# Patient Record
Sex: MALE | Race: WHITE | NOT HISPANIC OR LATINO | Employment: OTHER | URBAN - METROPOLITAN AREA
[De-identification: names, ages, dates, MRNs, and addresses within clinical notes are randomized per-mention and may not be internally consistent; named-entity substitution may affect disease eponyms.]

---

## 2018-02-24 ENCOUNTER — APPOINTMENT (OUTPATIENT)
Dept: GENERAL RADIOLOGY | Facility: HOSPITAL | Age: 63
End: 2018-02-24

## 2018-02-24 ENCOUNTER — HOSPITAL ENCOUNTER (INPATIENT)
Facility: HOSPITAL | Age: 63
LOS: 9 days | Discharge: HOME | End: 2018-03-05
Attending: EMERGENCY MEDICINE | Admitting: INTERNAL MEDICINE

## 2018-02-24 DIAGNOSIS — E87.20 METABOLIC ACIDOSIS: ICD-10-CM

## 2018-02-24 DIAGNOSIS — E13.10 DIABETIC KETOACIDOSIS WITHOUT COMA ASSOCIATED WITH OTHER SPECIFIED DIABETES MELLITUS (HCC): ICD-10-CM

## 2018-02-24 DIAGNOSIS — N17.9 ACUTE RENAL FAILURE, UNSPECIFIED ACUTE RENAL FAILURE TYPE (HCC): ICD-10-CM

## 2018-02-24 DIAGNOSIS — K81.9 CHOLECYSTITIS: ICD-10-CM

## 2018-02-24 DIAGNOSIS — Z74.09 IMPAIRED FUNCTIONAL MOBILITY, BALANCE, GAIT, AND ENDURANCE: ICD-10-CM

## 2018-02-24 DIAGNOSIS — J10.1 INFLUENZA B: Primary | ICD-10-CM

## 2018-02-24 DIAGNOSIS — I48.3 TYPICAL ATRIAL FLUTTER (HCC): ICD-10-CM

## 2018-02-24 PROBLEM — R11.2 NAUSEA & VOMITING: Status: ACTIVE | Noted: 2018-02-24

## 2018-02-24 PROBLEM — E11.9 DIABETES MELLITUS (HCC): Status: ACTIVE | Noted: 2018-02-24

## 2018-02-24 PROBLEM — R19.7 DIARRHEA: Status: ACTIVE | Noted: 2018-02-24

## 2018-02-24 PROBLEM — E11.10 DIABETIC KETOACIDOSIS WITHOUT COMA (HCC): Status: ACTIVE | Noted: 2018-02-24

## 2018-02-24 LAB
ALBUMIN SERPL-MCNC: 4.7 G/DL (ref 3.2–4.8)
ALBUMIN/GLOB SERPL: 1.5 G/DL (ref 1.5–2.5)
ALP SERPL-CCNC: 113 U/L (ref 25–100)
ALT SERPL W P-5'-P-CCNC: 43 U/L (ref 7–40)
ANION GAP SERPL CALCULATED.3IONS-SCNC: ABNORMAL MMOL/L (ref 3–11)
ARTERIAL PATENCY WRIST A: ABNORMAL
ARTERIAL PATENCY WRIST A: ABNORMAL
AST SERPL-CCNC: 37 U/L (ref 0–33)
ATMOSPHERIC PRESS: ABNORMAL MMHG
ATMOSPHERIC PRESS: ABNORMAL MMHG
BACTERIA UR QL AUTO: ABNORMAL /HPF
BASE EXCESS BLDA CALC-SCNC: -19 MMOL/L (ref 0–2)
BASE EXCESS BLDA CALC-SCNC: <-20 MMOL/L (ref 0–2)
BASOPHILS # BLD MANUAL: 0 10*3/MM3 (ref 0–0.2)
BASOPHILS NFR BLD AUTO: 0 % (ref 0–1)
BDY SITE: ABNORMAL
BDY SITE: ABNORMAL
BILIRUB SERPL-MCNC: 0.4 MG/DL (ref 0.3–1.2)
BILIRUB UR QL STRIP: NEGATIVE
BNP SERPL-MCNC: 103 PG/ML (ref 0–100)
BUN BLD-MCNC: 26 MG/DL (ref 9–23)
BUN BLD-MCNC: 32 MG/DL (ref 9–23)
BUN BLD-MCNC: 32 MG/DL (ref 9–23)
BUN/CREAT SERPL: 18.8 (ref 7–25)
BUN/CREAT SERPL: 20 (ref 7–25)
BUN/CREAT SERPL: 21.3 (ref 7–25)
CA-I SERPL ISE-MCNC: 1.3 MMOL/L (ref 1.12–1.32)
CA-I SERPL ISE-MCNC: 1.39 MMOL/L (ref 1.12–1.32)
CALCIUM SPEC-SCNC: 8.4 MG/DL (ref 8.7–10.4)
CALCIUM SPEC-SCNC: 8.7 MG/DL (ref 8.7–10.4)
CALCIUM SPEC-SCNC: 9.7 MG/DL (ref 8.7–10.4)
CHLORIDE SERPL-SCNC: 106 MMOL/L (ref 99–109)
CHLORIDE SERPL-SCNC: 112 MMOL/L (ref 99–109)
CHLORIDE SERPL-SCNC: 94 MMOL/L (ref 99–109)
CLARITY UR: CLEAR
CO2 BLDA-SCNC: 2.7 MMOL/L (ref 22–33)
CO2 BLDA-SCNC: 7.2 MMOL/L (ref 22–33)
CO2 SERPL-SCNC: <10 MMOL/L (ref 20–31)
COARSE GRAN CASTS URNS QL MICRO: ABNORMAL /LPF
COHGB MFR BLD: 0.5 % (ref 0–2)
COHGB MFR BLD: 1 % (ref 0–2)
COLOR UR: YELLOW
CREAT BLD-MCNC: 1.3 MG/DL (ref 0.6–1.3)
CREAT BLD-MCNC: 1.5 MG/DL (ref 0.6–1.3)
CREAT BLD-MCNC: 1.7 MG/DL (ref 0.6–1.3)
D-LACTATE SERPL-SCNC: 1.3 MMOL/L (ref 0.5–2)
D-LACTATE SERPL-SCNC: 5.4 MMOL/L (ref 0.5–2)
DEPRECATED RDW RBC AUTO: 41.4 FL (ref 37–54)
EOSINOPHIL # BLD MANUAL: 0 10*3/MM3 (ref 0.1–0.3)
EOSINOPHIL NFR BLD MANUAL: 0 % (ref 0–3)
ERYTHROCYTE [DISTWIDTH] IN BLOOD BY AUTOMATED COUNT: 13 % (ref 11.3–14.5)
FLUAV SUBTYP SPEC NAA+PROBE: NOT DETECTED
FLUBV RNA ISLT QL NAA+PROBE: DETECTED
GFR SERPL CREATININE-BSD FRML MDRD: 41 ML/MIN/1.73
GFR SERPL CREATININE-BSD FRML MDRD: 48 ML/MIN/1.73
GFR SERPL CREATININE-BSD FRML MDRD: 56 ML/MIN/1.73
GLOBULIN UR ELPH-MCNC: 3.2 GM/DL
GLUCOSE BLD-MCNC: 300 MG/DL (ref 70–100)
GLUCOSE BLD-MCNC: 484 MG/DL (ref 70–100)
GLUCOSE BLD-MCNC: 499 MG/DL (ref 70–100)
GLUCOSE BLD-MCNC: 627 MG/DL (ref 70–100)
GLUCOSE BLDC GLUCOMTR-MCNC: 188 MG/DL (ref 70–130)
GLUCOSE BLDC GLUCOMTR-MCNC: 215 MG/DL (ref 70–130)
GLUCOSE BLDC GLUCOMTR-MCNC: 233 MG/DL (ref 70–130)
GLUCOSE BLDC GLUCOMTR-MCNC: 238 MG/DL (ref 70–130)
GLUCOSE BLDC GLUCOMTR-MCNC: 238 MG/DL (ref 70–130)
GLUCOSE BLDC GLUCOMTR-MCNC: 267 MG/DL (ref 70–130)
GLUCOSE BLDC GLUCOMTR-MCNC: 291 MG/DL (ref 70–130)
GLUCOSE BLDC GLUCOMTR-MCNC: 331 MG/DL (ref 70–130)
GLUCOSE BLDC GLUCOMTR-MCNC: 443 MG/DL (ref 70–130)
GLUCOSE BLDC GLUCOMTR-MCNC: 562 MG/DL (ref 70–130)
GLUCOSE UR STRIP-MCNC: ABNORMAL MG/DL
HCO3 BLDA-SCNC: 2.4 MMOL/L (ref 20–26)
HCO3 BLDA-SCNC: 6.6 MMOL/L (ref 20–26)
HCT VFR BLD AUTO: 55.1 % (ref 38.9–50.9)
HCT VFR BLD CALC: 52 %
HCT VFR BLD CALC: 52.5 %
HGB BLD-MCNC: 18.3 G/DL (ref 13.1–17.5)
HGB BLDA-MCNC: 17 G/DL (ref 13.5–17.5)
HGB BLDA-MCNC: 17.1 G/DL (ref 13.5–17.5)
HGB UR QL STRIP.AUTO: ABNORMAL
HOLD SPECIMEN: NORMAL
HOROWITZ INDEX BLD+IHG-RTO: 21 %
HOROWITZ INDEX BLD+IHG-RTO: 28 %
HYALINE CASTS UR QL AUTO: ABNORMAL /LPF
KETONES UR QL STRIP: ABNORMAL
LEUKOCYTE ESTERASE UR QL STRIP.AUTO: NEGATIVE
LIPASE SERPL-CCNC: 38 U/L (ref 6–51)
LYMPHOCYTES # BLD MANUAL: 0.38 10*3/MM3 (ref 0.6–4.8)
LYMPHOCYTES NFR BLD MANUAL: 2 % (ref 24–44)
LYMPHOCYTES NFR BLD MANUAL: 8 % (ref 0–12)
MAGNESIUM SERPL-MCNC: 1.9 MG/DL (ref 1.3–2.7)
MAGNESIUM SERPL-MCNC: 2.1 MG/DL (ref 1.3–2.7)
MCH RBC QN AUTO: 29.1 PG (ref 27–31)
MCHC RBC AUTO-ENTMCNC: 33.2 G/DL (ref 32–36)
MCV RBC AUTO: 87.7 FL (ref 80–99)
METAMYELOCYTES NFR BLD MANUAL: 1 % (ref 0–0)
METHGB BLD QL: 1 % (ref 0–1.5)
METHGB BLD QL: 1.3 % (ref 0–1.5)
MODALITY: ABNORMAL
MODALITY: ABNORMAL
MONOCYTES # BLD AUTO: 1.53 10*3/MM3 (ref 0–1)
NEUTROPHILS # BLD AUTO: 16.63 10*3/MM3 (ref 1.5–8.3)
NEUTROPHILS NFR BLD MANUAL: 79 % (ref 41–71)
NEUTS BAND NFR BLD MANUAL: 8 % (ref 0–5)
NITRITE UR QL STRIP: NEGATIVE
OXYHGB MFR BLDV: 95.6 % (ref 94–99)
OXYHGB MFR BLDV: 97.5 % (ref 94–99)
PCO2 BLDA: 10.5 MM HG (ref 35–48)
PCO2 BLDA: 17.5 MM HG (ref 35–48)
PH BLDA: 6.96 PH UNITS (ref 7.35–7.45)
PH BLDA: 7.19 PH UNITS (ref 7.35–7.45)
PH UR STRIP.AUTO: <=5 [PH] (ref 5–8)
PHOSPHATE SERPL-MCNC: 2.2 MG/DL (ref 2.4–5.1)
PHOSPHATE SERPL-MCNC: 4.1 MG/DL (ref 2.4–5.1)
PLAT MORPH BLD: NORMAL
PLATELET # BLD AUTO: 337 10*3/MM3 (ref 150–450)
PMV BLD AUTO: 11.6 FL (ref 6–12)
PO2 BLDA: 142 MM HG (ref 83–108)
PO2 BLDA: 147 MM HG (ref 83–108)
POLYCHROMASIA BLD QL SMEAR: ABNORMAL
POTASSIUM BLD-SCNC: 4.5 MMOL/L (ref 3.5–5.5)
POTASSIUM BLD-SCNC: 4.5 MMOL/L (ref 3.5–5.5)
POTASSIUM BLD-SCNC: 4.7 MMOL/L (ref 3.5–5.5)
PROCALCITONIN SERPL-MCNC: 0.2 NG/ML
PROT SERPL-MCNC: 7.9 G/DL (ref 5.7–8.2)
PROT UR QL STRIP: ABNORMAL
RBC # BLD AUTO: 6.28 10*6/MM3 (ref 4.2–5.76)
RBC # UR: ABNORMAL /HPF
REF LAB TEST METHOD: ABNORMAL
SODIUM BLD-SCNC: 130 MMOL/L (ref 132–146)
SODIUM BLD-SCNC: 135 MMOL/L (ref 132–146)
SODIUM BLD-SCNC: 137 MMOL/L (ref 132–146)
SP GR UR STRIP: 1.02 (ref 1–1.03)
SQUAMOUS #/AREA URNS HPF: ABNORMAL /HPF
TROPONIN I SERPL-MCNC: 0 NG/ML (ref 0–0.07)
UROBILINOGEN UR QL STRIP: ABNORMAL
VARIANT LYMPHS NFR BLD MANUAL: 2 % (ref 0–5)
WBC MORPH BLD: NORMAL
WBC NRBC COR # BLD: 19.11 10*3/MM3 (ref 3.5–10.8)
WBC UR QL AUTO: ABNORMAL /HPF
WHOLE BLOOD HOLD SPECIMEN: NORMAL
WHOLE BLOOD HOLD SPECIMEN: NORMAL

## 2018-02-24 PROCEDURE — 84484 ASSAY OF TROPONIN QUANT: CPT

## 2018-02-24 PROCEDURE — 99285 EMERGENCY DEPT VISIT HI MDM: CPT

## 2018-02-24 PROCEDURE — 82805 BLOOD GASES W/O2 SATURATION: CPT | Performed by: INTERNAL MEDICINE

## 2018-02-24 PROCEDURE — 5A09357 ASSISTANCE WITH RESPIRATORY VENTILATION, LESS THAN 24 CONSECUTIVE HOURS, CONTINUOUS POSITIVE AIRWAY PRESSURE: ICD-10-PCS | Performed by: INTERNAL MEDICINE

## 2018-02-24 PROCEDURE — 80053 COMPREHEN METABOLIC PANEL: CPT | Performed by: EMERGENCY MEDICINE

## 2018-02-24 PROCEDURE — 82947 ASSAY GLUCOSE BLOOD QUANT: CPT

## 2018-02-24 PROCEDURE — 82330 ASSAY OF CALCIUM: CPT | Performed by: EMERGENCY MEDICINE

## 2018-02-24 PROCEDURE — 83605 ASSAY OF LACTIC ACID: CPT | Performed by: EMERGENCY MEDICINE

## 2018-02-24 PROCEDURE — 94799 UNLISTED PULMONARY SVC/PX: CPT

## 2018-02-24 PROCEDURE — 93005 ELECTROCARDIOGRAM TRACING: CPT | Performed by: EMERGENCY MEDICINE

## 2018-02-24 PROCEDURE — 25810000003 POTASSIUM CHLORIDE PER 2 MEQ: Performed by: EMERGENCY MEDICINE

## 2018-02-24 PROCEDURE — 87040 BLOOD CULTURE FOR BACTERIA: CPT | Performed by: EMERGENCY MEDICINE

## 2018-02-24 PROCEDURE — 99291 CRITICAL CARE FIRST HOUR: CPT | Performed by: INTERNAL MEDICINE

## 2018-02-24 PROCEDURE — 85007 BL SMEAR W/DIFF WBC COUNT: CPT | Performed by: EMERGENCY MEDICINE

## 2018-02-24 PROCEDURE — 25010000002 VANCOMYCIN HCL IN NACL 1.75-0.9 GM/500ML-% SOLUTION: Performed by: PHYSICIAN ASSISTANT

## 2018-02-24 PROCEDURE — 85025 COMPLETE CBC W/AUTO DIFF WBC: CPT | Performed by: EMERGENCY MEDICINE

## 2018-02-24 PROCEDURE — 83880 ASSAY OF NATRIURETIC PEPTIDE: CPT | Performed by: EMERGENCY MEDICINE

## 2018-02-24 PROCEDURE — 71045 X-RAY EXAM CHEST 1 VIEW: CPT

## 2018-02-24 PROCEDURE — 82805 BLOOD GASES W/O2 SATURATION: CPT | Performed by: PHYSICIAN ASSISTANT

## 2018-02-24 PROCEDURE — 36600 WITHDRAWAL OF ARTERIAL BLOOD: CPT | Performed by: PHYSICIAN ASSISTANT

## 2018-02-24 PROCEDURE — 83735 ASSAY OF MAGNESIUM: CPT | Performed by: EMERGENCY MEDICINE

## 2018-02-24 PROCEDURE — 83036 HEMOGLOBIN GLYCOSYLATED A1C: CPT | Performed by: INTERNAL MEDICINE

## 2018-02-24 PROCEDURE — 87502 INFLUENZA DNA AMP PROBE: CPT | Performed by: PHYSICIAN ASSISTANT

## 2018-02-24 PROCEDURE — 81001 URINALYSIS AUTO W/SCOPE: CPT | Performed by: EMERGENCY MEDICINE

## 2018-02-24 PROCEDURE — 36600 WITHDRAWAL OF ARTERIAL BLOOD: CPT | Performed by: INTERNAL MEDICINE

## 2018-02-24 PROCEDURE — 80048 BASIC METABOLIC PNL TOTAL CA: CPT | Performed by: EMERGENCY MEDICINE

## 2018-02-24 PROCEDURE — 25010000002 PIPERACILLIN SOD-TAZOBACTAM PER 1 G: Performed by: PHYSICIAN ASSISTANT

## 2018-02-24 PROCEDURE — 63710000001 INSULIN REGULAR HUMAN PER 5 UNITS: Performed by: EMERGENCY MEDICINE

## 2018-02-24 PROCEDURE — 83690 ASSAY OF LIPASE: CPT | Performed by: PHYSICIAN ASSISTANT

## 2018-02-24 PROCEDURE — 84100 ASSAY OF PHOSPHORUS: CPT | Performed by: EMERGENCY MEDICINE

## 2018-02-24 PROCEDURE — 25010000002 HEPARIN (PORCINE) PER 1000 UNITS: Performed by: NURSE PRACTITIONER

## 2018-02-24 PROCEDURE — 84145 PROCALCITONIN (PCT): CPT | Performed by: INTERNAL MEDICINE

## 2018-02-24 RX ORDER — ACETAMINOPHEN 650 MG/1
650 SUPPOSITORY RECTAL EVERY 4 HOURS PRN
Status: DISCONTINUED | OUTPATIENT
Start: 2018-02-24 | End: 2018-03-05 | Stop reason: HOSPADM

## 2018-02-24 RX ORDER — POTASSIUM CHLORIDE 750 MG/1
20 CAPSULE, EXTENDED RELEASE ORAL AS NEEDED
Status: DISCONTINUED | OUTPATIENT
Start: 2018-02-24 | End: 2018-02-26

## 2018-02-24 RX ORDER — DEXTROSE AND SODIUM CHLORIDE 5; .45 G/100ML; G/100ML
150 INJECTION, SOLUTION INTRAVENOUS CONTINUOUS PRN
Status: DISCONTINUED | OUTPATIENT
Start: 2018-02-24 | End: 2018-02-26

## 2018-02-24 RX ORDER — SODIUM CHLORIDE 0.9 % (FLUSH) 0.9 %
1-10 SYRINGE (ML) INJECTION AS NEEDED
Status: DISCONTINUED | OUTPATIENT
Start: 2018-02-24 | End: 2018-03-05 | Stop reason: HOSPADM

## 2018-02-24 RX ORDER — SODIUM CHLORIDE 450 MG/100ML
250 INJECTION, SOLUTION INTRAVENOUS CONTINUOUS
Status: DISCONTINUED | OUTPATIENT
Start: 2018-02-24 | End: 2018-02-26

## 2018-02-24 RX ORDER — POTASSIUM CHLORIDE 7.46 G/1000ML
10 INJECTION, SOLUTION INTRAVENOUS
Status: DISCONTINUED | OUTPATIENT
Start: 2018-02-24 | End: 2018-02-26

## 2018-02-24 RX ORDER — POTASSIUM CHLORIDE 1.5 G/1.77G
20 POWDER, FOR SOLUTION ORAL AS NEEDED
Status: DISCONTINUED | OUTPATIENT
Start: 2018-02-24 | End: 2018-02-26

## 2018-02-24 RX ORDER — ONDANSETRON 2 MG/ML
4 INJECTION INTRAMUSCULAR; INTRAVENOUS EVERY 6 HOURS PRN
Status: DISCONTINUED | OUTPATIENT
Start: 2018-02-24 | End: 2018-02-26

## 2018-02-24 RX ORDER — SODIUM CHLORIDE 0.9 % (FLUSH) 0.9 %
10 SYRINGE (ML) INJECTION AS NEEDED
Status: DISCONTINUED | OUTPATIENT
Start: 2018-02-24 | End: 2018-02-26

## 2018-02-24 RX ORDER — IPRATROPIUM BROMIDE AND ALBUTEROL SULFATE 2.5; .5 MG/3ML; MG/3ML
3 SOLUTION RESPIRATORY (INHALATION) EVERY 6 HOURS PRN
Status: DISCONTINUED | OUTPATIENT
Start: 2018-02-24 | End: 2018-03-05 | Stop reason: HOSPADM

## 2018-02-24 RX ORDER — DEXTROSE, SODIUM CHLORIDE, AND POTASSIUM CHLORIDE 5; .45; .15 G/100ML; G/100ML; G/100ML
100 INJECTION INTRAVENOUS CONTINUOUS PRN
Status: DISCONTINUED | OUTPATIENT
Start: 2018-02-24 | End: 2018-02-26

## 2018-02-24 RX ORDER — OSELTAMIVIR PHOSPHATE 75 MG/1
75 CAPSULE ORAL EVERY 12 HOURS
Status: DISCONTINUED | OUTPATIENT
Start: 2018-02-25 | End: 2018-02-26

## 2018-02-24 RX ORDER — POTASSIUM CHLORIDE 750 MG/1
40 CAPSULE, EXTENDED RELEASE ORAL AS NEEDED
Status: DISCONTINUED | OUTPATIENT
Start: 2018-02-24 | End: 2018-02-26

## 2018-02-24 RX ORDER — PANTOPRAZOLE SODIUM 40 MG/1
40 TABLET, DELAYED RELEASE ORAL
Status: DISCONTINUED | OUTPATIENT
Start: 2018-02-25 | End: 2018-02-26

## 2018-02-24 RX ORDER — ACETAMINOPHEN 325 MG/1
650 TABLET ORAL EVERY 4 HOURS PRN
Status: DISCONTINUED | OUTPATIENT
Start: 2018-02-24 | End: 2018-02-26

## 2018-02-24 RX ORDER — OSELTAMIVIR PHOSPHATE 75 MG/1
75 CAPSULE ORAL ONCE
Status: COMPLETED | OUTPATIENT
Start: 2018-02-24 | End: 2018-02-24

## 2018-02-24 RX ORDER — POTASSIUM CHLORIDE 750 MG/1
10 CAPSULE, EXTENDED RELEASE ORAL AS NEEDED
Status: DISCONTINUED | OUTPATIENT
Start: 2018-02-24 | End: 2018-02-27

## 2018-02-24 RX ORDER — MAGNESIUM SULFATE HEPTAHYDRATE 40 MG/ML
2 INJECTION, SOLUTION INTRAVENOUS AS NEEDED
Status: DISCONTINUED | OUTPATIENT
Start: 2018-02-24 | End: 2018-02-26

## 2018-02-24 RX ORDER — HEPARIN SODIUM 5000 [USP'U]/ML
5000 INJECTION, SOLUTION INTRAVENOUS; SUBCUTANEOUS EVERY 12 HOURS SCHEDULED
Status: DISCONTINUED | OUTPATIENT
Start: 2018-02-24 | End: 2018-02-26

## 2018-02-24 RX ORDER — VANCOMYCIN 1.75 GRAM/500 ML IN 0.9 % SODIUM CHLORIDE INTRAVENOUS
20 ONCE
Status: COMPLETED | OUTPATIENT
Start: 2018-02-24 | End: 2018-02-24

## 2018-02-24 RX ORDER — ONDANSETRON 4 MG/1
4 TABLET, FILM COATED ORAL EVERY 6 HOURS PRN
Status: DISCONTINUED | OUTPATIENT
Start: 2018-02-24 | End: 2018-02-26

## 2018-02-24 RX ORDER — SODIUM CHLORIDE AND POTASSIUM CHLORIDE 150; 450 MG/100ML; MG/100ML
250 INJECTION, SOLUTION INTRAVENOUS CONTINUOUS PRN
Status: DISCONTINUED | OUTPATIENT
Start: 2018-02-24 | End: 2018-02-26

## 2018-02-24 RX ORDER — POTASSIUM CHLORIDE 1.5 G/1.77G
40 POWDER, FOR SOLUTION ORAL AS NEEDED
Status: DISCONTINUED | OUTPATIENT
Start: 2018-02-24 | End: 2018-02-26

## 2018-02-24 RX ORDER — POTASSIUM CHLORIDE 1.5 G/1.77G
10 POWDER, FOR SOLUTION ORAL AS NEEDED
Status: DISCONTINUED | OUTPATIENT
Start: 2018-02-24 | End: 2018-02-26

## 2018-02-24 RX ORDER — DEXTROSE MONOHYDRATE 25 G/50ML
12.5 INJECTION, SOLUTION INTRAVENOUS
Status: DISCONTINUED | OUTPATIENT
Start: 2018-02-24 | End: 2018-02-26

## 2018-02-24 RX ORDER — MAGNESIUM SULFATE HEPTAHYDRATE 40 MG/ML
4 INJECTION, SOLUTION INTRAVENOUS AS NEEDED
Status: DISCONTINUED | OUTPATIENT
Start: 2018-02-24 | End: 2018-02-26

## 2018-02-24 RX ADMIN — POTASSIUM CHLORIDE AND SODIUM CHLORIDE 250 ML/HR: 450; 150 INJECTION, SOLUTION INTRAVENOUS at 16:29

## 2018-02-24 RX ADMIN — POTASSIUM CHLORIDE AND SODIUM CHLORIDE 250 ML/HR: 450; 150 INJECTION, SOLUTION INTRAVENOUS at 21:00

## 2018-02-24 RX ADMIN — INSULIN HUMAN 10 UNITS: 100 INJECTION, SOLUTION PARENTERAL at 14:14

## 2018-02-24 RX ADMIN — SODIUM CHLORIDE 1000 ML: 9 INJECTION, SOLUTION INTRAVENOUS at 13:40

## 2018-02-24 RX ADMIN — OSELTAMIVIR PHOSPHATE 75 MG: 75 CAPSULE ORAL at 16:52

## 2018-02-24 RX ADMIN — TAZOBACTAM SODIUM AND PIPERACILLIN SODIUM 4.5 G: 500; 4 INJECTION, SOLUTION INTRAVENOUS at 14:05

## 2018-02-24 RX ADMIN — POTASSIUM PHOSPHATE, MONOBASIC AND POTASSIUM PHOSPHATE, DIBASIC 15 MMOL: 224; 236 INJECTION, SOLUTION INTRAVENOUS at 21:46

## 2018-02-24 RX ADMIN — SODIUM CHLORIDE 0.1 UNITS/KG/HR: 9 INJECTION, SOLUTION INTRAVENOUS at 14:15

## 2018-02-24 RX ADMIN — SODIUM CHLORIDE 1000 ML: 9 INJECTION, SOLUTION INTRAVENOUS at 12:35

## 2018-02-24 RX ADMIN — HEPARIN SODIUM 5000 UNITS: 5000 INJECTION, SOLUTION INTRAVENOUS; SUBCUTANEOUS at 21:01

## 2018-02-24 RX ADMIN — Medication 1750 MG: at 15:06

## 2018-02-24 RX ADMIN — SODIUM CHLORIDE 1000 ML: 9 INJECTION, SOLUTION INTRAVENOUS at 12:48

## 2018-02-25 ENCOUNTER — ANESTHESIA (OUTPATIENT)
Dept: PERIOP | Facility: HOSPITAL | Age: 63
End: 2018-02-25

## 2018-02-25 ENCOUNTER — ANESTHESIA EVENT (OUTPATIENT)
Dept: PERIOP | Facility: HOSPITAL | Age: 63
End: 2018-02-25

## 2018-02-25 ENCOUNTER — APPOINTMENT (OUTPATIENT)
Dept: CT IMAGING | Facility: HOSPITAL | Age: 63
End: 2018-02-25

## 2018-02-25 PROBLEM — I48.92 ATRIAL FLUTTER (HCC): Status: ACTIVE | Noted: 2018-02-25

## 2018-02-25 LAB
ABO GROUP BLD: NORMAL
ABO GROUP BLD: NORMAL
ALBUMIN SERPL-MCNC: 3.4 G/DL (ref 3.2–4.8)
ALBUMIN/GLOB SERPL: 1.5 G/DL (ref 1.5–2.5)
ALP SERPL-CCNC: 74 U/L (ref 25–100)
ALT SERPL W P-5'-P-CCNC: 29 U/L (ref 7–40)
ANION GAP SERPL CALCULATED.3IONS-SCNC: 12 MMOL/L (ref 3–11)
ANION GAP SERPL CALCULATED.3IONS-SCNC: 13 MMOL/L (ref 3–11)
ANION GAP SERPL CALCULATED.3IONS-SCNC: 7 MMOL/L (ref 3–11)
ANION GAP SERPL CALCULATED.3IONS-SCNC: 8 MMOL/L (ref 3–11)
ANION GAP SERPL CALCULATED.3IONS-SCNC: 9 MMOL/L (ref 3–11)
ARTERIAL PATENCY WRIST A: ABNORMAL
AST SERPL-CCNC: 23 U/L (ref 0–33)
ATMOSPHERIC PRESS: ABNORMAL MMHG
BASE EXCESS BLDA CALC-SCNC: -14.2 MMOL/L (ref 0–2)
BASOPHILS # BLD AUTO: 0 10*3/MM3 (ref 0–0.2)
BASOPHILS NFR BLD AUTO: 0 % (ref 0–1)
BDY SITE: ABNORMAL
BILIRUB SERPL-MCNC: 0.3 MG/DL (ref 0.3–1.2)
BLD GP AB SCN SERPL QL: NEGATIVE
BUN BLD-MCNC: 13 MG/DL (ref 9–23)
BUN BLD-MCNC: 19 MG/DL (ref 9–23)
BUN BLD-MCNC: 22 MG/DL (ref 9–23)
BUN BLD-MCNC: 27 MG/DL (ref 9–23)
BUN BLD-MCNC: 9 MG/DL (ref 9–23)
BUN/CREAT SERPL: 11.3 (ref 7–25)
BUN/CREAT SERPL: 16.3 (ref 7–25)
BUN/CREAT SERPL: 22 (ref 7–25)
BUN/CREAT SERPL: 23.8 (ref 7–25)
BUN/CREAT SERPL: 24.5 (ref 7–25)
CA-I SERPL ISE-MCNC: 1.36 MMOL/L (ref 1.12–1.32)
CA-I SERPL ISE-MCNC: 1.39 MMOL/L (ref 1.12–1.32)
CA-I SERPL ISE-MCNC: 1.39 MMOL/L (ref 1.12–1.32)
CA-I SERPL ISE-MCNC: 1.4 MMOL/L (ref 1.12–1.32)
CA-I SERPL ISE-MCNC: 1.42 MMOL/L (ref 1.12–1.32)
CALCIUM SPEC-SCNC: 8.4 MG/DL (ref 8.7–10.4)
CALCIUM SPEC-SCNC: 8.5 MG/DL (ref 8.7–10.4)
CALCIUM SPEC-SCNC: 8.6 MG/DL (ref 8.7–10.4)
CALCIUM SPEC-SCNC: 8.6 MG/DL (ref 8.7–10.4)
CALCIUM SPEC-SCNC: 8.9 MG/DL (ref 8.7–10.4)
CHLORIDE SERPL-SCNC: 112 MMOL/L (ref 99–109)
CHLORIDE SERPL-SCNC: 113 MMOL/L (ref 99–109)
CHLORIDE SERPL-SCNC: 113 MMOL/L (ref 99–109)
CHLORIDE SERPL-SCNC: 114 MMOL/L (ref 99–109)
CHLORIDE SERPL-SCNC: 114 MMOL/L (ref 99–109)
CO2 BLDA-SCNC: 11.6 MMOL/L (ref 22–33)
CO2 SERPL-SCNC: 11 MMOL/L (ref 20–31)
CO2 SERPL-SCNC: 12 MMOL/L (ref 20–31)
CO2 SERPL-SCNC: 13 MMOL/L (ref 20–31)
CO2 SERPL-SCNC: 14 MMOL/L (ref 20–31)
CO2 SERPL-SCNC: 15 MMOL/L (ref 20–31)
COHGB MFR BLD: 0.1 % (ref 0–2)
CREAT BLD-MCNC: 0.8 MG/DL (ref 0.6–1.3)
CREAT BLD-MCNC: 1 MG/DL (ref 0.6–1.3)
CREAT BLD-MCNC: 1.1 MG/DL (ref 0.6–1.3)
D-LACTATE SERPL-SCNC: 0.6 MMOL/L (ref 0.5–2)
DEPRECATED RDW RBC AUTO: 39 FL (ref 37–54)
EOSINOPHIL # BLD AUTO: 0 10*3/MM3 (ref 0–0.3)
EOSINOPHIL NFR BLD AUTO: 0 % (ref 0–3)
ERYTHROCYTE [DISTWIDTH] IN BLOOD BY AUTOMATED COUNT: 13 % (ref 11.3–14.5)
GFR SERPL CREATININE-BSD FRML MDRD: 68 ML/MIN/1.73
GFR SERPL CREATININE-BSD FRML MDRD: 76 ML/MIN/1.73
GFR SERPL CREATININE-BSD FRML MDRD: 99 ML/MIN/1.73
GLOBULIN UR ELPH-MCNC: 2.2 GM/DL
GLUCOSE BLD-MCNC: 125 MG/DL (ref 70–100)
GLUCOSE BLD-MCNC: 192 MG/DL (ref 70–100)
GLUCOSE BLD-MCNC: 199 MG/DL (ref 70–100)
GLUCOSE BLD-MCNC: 203 MG/DL (ref 70–100)
GLUCOSE BLD-MCNC: 218 MG/DL (ref 70–100)
GLUCOSE BLDC GLUCOMTR-MCNC: 101 MG/DL (ref 70–130)
GLUCOSE BLDC GLUCOMTR-MCNC: 109 MG/DL (ref 70–130)
GLUCOSE BLDC GLUCOMTR-MCNC: 166 MG/DL (ref 70–130)
GLUCOSE BLDC GLUCOMTR-MCNC: 169 MG/DL (ref 70–130)
GLUCOSE BLDC GLUCOMTR-MCNC: 170 MG/DL (ref 70–130)
GLUCOSE BLDC GLUCOMTR-MCNC: 175 MG/DL (ref 70–130)
GLUCOSE BLDC GLUCOMTR-MCNC: 181 MG/DL (ref 70–130)
GLUCOSE BLDC GLUCOMTR-MCNC: 182 MG/DL (ref 70–130)
GLUCOSE BLDC GLUCOMTR-MCNC: 183 MG/DL (ref 70–130)
GLUCOSE BLDC GLUCOMTR-MCNC: 184 MG/DL (ref 70–130)
GLUCOSE BLDC GLUCOMTR-MCNC: 186 MG/DL (ref 70–130)
GLUCOSE BLDC GLUCOMTR-MCNC: 192 MG/DL (ref 70–130)
GLUCOSE BLDC GLUCOMTR-MCNC: 194 MG/DL (ref 70–130)
GLUCOSE BLDC GLUCOMTR-MCNC: 195 MG/DL (ref 70–130)
GLUCOSE BLDC GLUCOMTR-MCNC: 195 MG/DL (ref 70–130)
GLUCOSE BLDC GLUCOMTR-MCNC: 215 MG/DL (ref 70–130)
GLUCOSE BLDC GLUCOMTR-MCNC: 222 MG/DL (ref 70–130)
HCO3 BLDA-SCNC: 10.9 MMOL/L (ref 20–26)
HCT VFR BLD AUTO: 44.2 % (ref 38.9–50.9)
HCT VFR BLD CALC: 48.3 %
HGB BLD-MCNC: 15.2 G/DL (ref 13.1–17.5)
HGB BLDA-MCNC: 15.8 G/DL (ref 13.5–17.5)
HOROWITZ INDEX BLD+IHG-RTO: 28 %
IMM GRANULOCYTES # BLD: 0.05 10*3/MM3 (ref 0–0.03)
IMM GRANULOCYTES NFR BLD: 0.4 % (ref 0–0.6)
LYMPHOCYTES # BLD AUTO: 1 10*3/MM3 (ref 0.6–4.8)
LYMPHOCYTES NFR BLD AUTO: 7.4 % (ref 24–44)
MAGNESIUM SERPL-MCNC: 1.8 MG/DL (ref 1.3–2.7)
MAGNESIUM SERPL-MCNC: 1.8 MG/DL (ref 1.3–2.7)
MAGNESIUM SERPL-MCNC: 1.9 MG/DL (ref 1.3–2.7)
MAGNESIUM SERPL-MCNC: 2.3 MG/DL (ref 1.3–2.7)
MAGNESIUM SERPL-MCNC: 2.7 MG/DL (ref 1.3–2.7)
MCH RBC QN AUTO: 28.7 PG (ref 27–31)
MCHC RBC AUTO-ENTMCNC: 34.4 G/DL (ref 32–36)
MCV RBC AUTO: 83.6 FL (ref 80–99)
METHGB BLD QL: 0.7 % (ref 0–1.5)
MODALITY: ABNORMAL
MONOCYTES # BLD AUTO: 1.09 10*3/MM3 (ref 0–1)
MONOCYTES NFR BLD AUTO: 8 % (ref 0–12)
NEUTROPHILS # BLD AUTO: 11.44 10*3/MM3 (ref 1.5–8.3)
NEUTROPHILS NFR BLD AUTO: 84.2 % (ref 41–71)
OXYHGB MFR BLDV: 97 % (ref 94–99)
PCO2 BLDA: 24.2 MM HG (ref 35–48)
PH BLDA: 7.26 PH UNITS (ref 7.35–7.45)
PHOSPHATE SERPL-MCNC: 1.5 MG/DL (ref 2.4–5.1)
PHOSPHATE SERPL-MCNC: 1.5 MG/DL (ref 2.4–5.1)
PHOSPHATE SERPL-MCNC: 1.6 MG/DL (ref 2.4–5.1)
PHOSPHATE SERPL-MCNC: 1.7 MG/DL (ref 2.4–5.1)
PHOSPHATE SERPL-MCNC: 2.1 MG/DL (ref 2.4–5.1)
PLATELET # BLD AUTO: 227 10*3/MM3 (ref 150–450)
PMV BLD AUTO: 10.4 FL (ref 6–12)
PO2 BLDA: 119 MM HG (ref 83–108)
POTASSIUM BLD-SCNC: 3.4 MMOL/L (ref 3.5–5.5)
POTASSIUM BLD-SCNC: 3.6 MMOL/L (ref 3.5–5.5)
POTASSIUM BLD-SCNC: 3.6 MMOL/L (ref 3.5–5.5)
POTASSIUM BLD-SCNC: 4.1 MMOL/L (ref 3.5–5.5)
POTASSIUM BLD-SCNC: 4.1 MMOL/L (ref 3.5–5.5)
PROT SERPL-MCNC: 5.6 G/DL (ref 5.7–8.2)
RBC # BLD AUTO: 5.29 10*6/MM3 (ref 4.2–5.76)
RH BLD: POSITIVE
RH BLD: POSITIVE
SODIUM BLD-SCNC: 135 MMOL/L (ref 132–146)
SODIUM BLD-SCNC: 135 MMOL/L (ref 132–146)
SODIUM BLD-SCNC: 136 MMOL/L (ref 132–146)
SODIUM BLD-SCNC: 136 MMOL/L (ref 132–146)
SODIUM BLD-SCNC: 138 MMOL/L (ref 132–146)
WBC NRBC COR # BLD: 13.58 10*3/MM3 (ref 3.5–10.8)

## 2018-02-25 PROCEDURE — 85025 COMPLETE CBC W/AUTO DIFF WBC: CPT | Performed by: NURSE PRACTITIONER

## 2018-02-25 PROCEDURE — 83605 ASSAY OF LACTIC ACID: CPT | Performed by: NURSE PRACTITIONER

## 2018-02-25 PROCEDURE — 86900 BLOOD TYPING SEROLOGIC ABO: CPT | Performed by: INTERNAL MEDICINE

## 2018-02-25 PROCEDURE — 86850 RBC ANTIBODY SCREEN: CPT | Performed by: INTERNAL MEDICINE

## 2018-02-25 PROCEDURE — 83735 ASSAY OF MAGNESIUM: CPT | Performed by: INTERNAL MEDICINE

## 2018-02-25 PROCEDURE — 86900 BLOOD TYPING SEROLOGIC ABO: CPT

## 2018-02-25 PROCEDURE — 25810000003 POTASSIUM CHLORIDE PER 2 MEQ: Performed by: EMERGENCY MEDICINE

## 2018-02-25 PROCEDURE — 82330 ASSAY OF CALCIUM: CPT | Performed by: EMERGENCY MEDICINE

## 2018-02-25 PROCEDURE — 25010000002 PIPERACILLIN SOD-TAZOBACTAM PER 1 G: Performed by: INTERNAL MEDICINE

## 2018-02-25 PROCEDURE — 25010000002 DIGOXIN PER 500 MCG: Performed by: NURSE PRACTITIONER

## 2018-02-25 PROCEDURE — 25010000002 HYDROMORPHONE PER 4 MG: Performed by: NURSE PRACTITIONER

## 2018-02-25 PROCEDURE — 25010000002 HEPARIN (PORCINE) PER 1000 UNITS: Performed by: NURSE PRACTITIONER

## 2018-02-25 PROCEDURE — 36600 WITHDRAWAL OF ARTERIAL BLOOD: CPT | Performed by: NURSE PRACTITIONER

## 2018-02-25 PROCEDURE — 93010 ELECTROCARDIOGRAM REPORT: CPT | Performed by: INTERNAL MEDICINE

## 2018-02-25 PROCEDURE — P9041 ALBUMIN (HUMAN),5%, 50ML: HCPCS | Performed by: NURSE PRACTITIONER

## 2018-02-25 PROCEDURE — 80053 COMPREHEN METABOLIC PANEL: CPT | Performed by: NURSE PRACTITIONER

## 2018-02-25 PROCEDURE — 82330 ASSAY OF CALCIUM: CPT | Performed by: INTERNAL MEDICINE

## 2018-02-25 PROCEDURE — 82962 GLUCOSE BLOOD TEST: CPT

## 2018-02-25 PROCEDURE — 80048 BASIC METABOLIC PNL TOTAL CA: CPT | Performed by: INTERNAL MEDICINE

## 2018-02-25 PROCEDURE — 74176 CT ABD & PELVIS W/O CONTRAST: CPT

## 2018-02-25 PROCEDURE — 0FT40ZZ RESECTION OF GALLBLADDER, OPEN APPROACH: ICD-10-PCS | Performed by: SURGERY

## 2018-02-25 PROCEDURE — 94799 UNLISTED PULMONARY SVC/PX: CPT

## 2018-02-25 PROCEDURE — 82805 BLOOD GASES W/O2 SATURATION: CPT | Performed by: NURSE PRACTITIONER

## 2018-02-25 PROCEDURE — 25010000002 FENTANYL CITRATE (PF) 100 MCG/2ML SOLUTION: Performed by: ANESTHESIOLOGY

## 2018-02-25 PROCEDURE — 84100 ASSAY OF PHOSPHORUS: CPT | Performed by: EMERGENCY MEDICINE

## 2018-02-25 PROCEDURE — 25010000002 NEOSTIGMINE 10 MG/10ML SOLUTION: Performed by: ANESTHESIOLOGY

## 2018-02-25 PROCEDURE — 25010000003 POTASSIUM CHLORIDE 10 MEQ/100ML SOLUTION: Performed by: EMERGENCY MEDICINE

## 2018-02-25 PROCEDURE — 83735 ASSAY OF MAGNESIUM: CPT | Performed by: EMERGENCY MEDICINE

## 2018-02-25 PROCEDURE — 63710000001 INSULIN REGULAR HUMAN PER 5 UNITS: Performed by: EMERGENCY MEDICINE

## 2018-02-25 PROCEDURE — 25010000002 HYDROMORPHONE PER 4 MG: Performed by: SURGERY

## 2018-02-25 PROCEDURE — 25010000002 PROPOFOL 10 MG/ML EMULSION: Performed by: ANESTHESIOLOGY

## 2018-02-25 PROCEDURE — 0DU907Z SUPPLEMENT DUODENUM WITH AUTOLOGOUS TISSUE SUBSTITUTE, OPEN APPROACH: ICD-10-PCS | Performed by: SURGERY

## 2018-02-25 PROCEDURE — 25010000002 HYDROMORPHONE PER 4 MG: Performed by: ANESTHESIOLOGY

## 2018-02-25 PROCEDURE — 84100 ASSAY OF PHOSPHORUS: CPT | Performed by: INTERNAL MEDICINE

## 2018-02-25 PROCEDURE — 25010000002 ALBUMIN HUMAN 5% PER 50 ML: Performed by: NURSE PRACTITIONER

## 2018-02-25 PROCEDURE — 86901 BLOOD TYPING SEROLOGIC RH(D): CPT | Performed by: INTERNAL MEDICINE

## 2018-02-25 PROCEDURE — 80048 BASIC METABOLIC PNL TOTAL CA: CPT | Performed by: EMERGENCY MEDICINE

## 2018-02-25 PROCEDURE — 25010000002 MORPHINE SULFATE (PF) 2 MG/ML SOLUTION: Performed by: INTERNAL MEDICINE

## 2018-02-25 PROCEDURE — 93005 ELECTROCARDIOGRAM TRACING: CPT | Performed by: INTERNAL MEDICINE

## 2018-02-25 PROCEDURE — 88304 TISSUE EXAM BY PATHOLOGIST: CPT | Performed by: SURGERY

## 2018-02-25 PROCEDURE — 87040 BLOOD CULTURE FOR BACTERIA: CPT | Performed by: INTERNAL MEDICINE

## 2018-02-25 PROCEDURE — 86901 BLOOD TYPING SEROLOGIC RH(D): CPT

## 2018-02-25 PROCEDURE — 99291 CRITICAL CARE FIRST HOUR: CPT | Performed by: INTERNAL MEDICINE

## 2018-02-25 PROCEDURE — 25010000002 SUCCINYLCHOLINE PER 20 MG: Performed by: ANESTHESIOLOGY

## 2018-02-25 PROCEDURE — 25010000002 ONDANSETRON PER 1 MG: Performed by: ANESTHESIOLOGY

## 2018-02-25 PROCEDURE — 99233 SBSQ HOSP IP/OBS HIGH 50: CPT | Performed by: INTERNAL MEDICINE

## 2018-02-25 RX ORDER — ALBUMIN, HUMAN INJ 5% 5 %
250 SOLUTION INTRAVENOUS ONCE
Status: COMPLETED | OUTPATIENT
Start: 2018-02-25 | End: 2018-02-25

## 2018-02-25 RX ORDER — MORPHINE SULFATE 2 MG/ML
2 INJECTION, SOLUTION INTRAMUSCULAR; INTRAVENOUS
Status: DISCONTINUED | OUTPATIENT
Start: 2018-02-25 | End: 2018-02-26

## 2018-02-25 RX ORDER — DIGOXIN 0.25 MG/ML
250 INJECTION INTRAMUSCULAR; INTRAVENOUS ONCE
Status: COMPLETED | OUTPATIENT
Start: 2018-02-25 | End: 2018-02-25

## 2018-02-25 RX ORDER — FENTANYL CITRATE 50 UG/ML
INJECTION, SOLUTION INTRAMUSCULAR; INTRAVENOUS AS NEEDED
Status: DISCONTINUED | OUTPATIENT
Start: 2018-02-25 | End: 2018-02-25 | Stop reason: SURG

## 2018-02-25 RX ORDER — ASPIRIN 81 MG/1
81 TABLET, CHEWABLE ORAL DAILY
Status: DISCONTINUED | OUTPATIENT
Start: 2018-02-25 | End: 2018-02-26

## 2018-02-25 RX ORDER — ONDANSETRON 2 MG/ML
INJECTION INTRAMUSCULAR; INTRAVENOUS AS NEEDED
Status: DISCONTINUED | OUTPATIENT
Start: 2018-02-25 | End: 2018-02-25 | Stop reason: SURG

## 2018-02-25 RX ORDER — MAGNESIUM HYDROXIDE 1200 MG/15ML
LIQUID ORAL AS NEEDED
Status: DISCONTINUED | OUTPATIENT
Start: 2018-02-25 | End: 2018-02-25 | Stop reason: HOSPADM

## 2018-02-25 RX ORDER — FENTANYL CITRATE 50 UG/ML
50 INJECTION, SOLUTION INTRAMUSCULAR; INTRAVENOUS
Status: DISCONTINUED | OUTPATIENT
Start: 2018-02-25 | End: 2018-02-25 | Stop reason: HOSPADM

## 2018-02-25 RX ORDER — ROCURONIUM BROMIDE 10 MG/ML
INJECTION, SOLUTION INTRAVENOUS AS NEEDED
Status: DISCONTINUED | OUTPATIENT
Start: 2018-02-25 | End: 2018-02-25 | Stop reason: SURG

## 2018-02-25 RX ORDER — HYDROMORPHONE HYDROCHLORIDE 1 MG/ML
0.5 INJECTION, SOLUTION INTRAMUSCULAR; INTRAVENOUS; SUBCUTANEOUS
Status: DISCONTINUED | OUTPATIENT
Start: 2018-02-25 | End: 2018-02-25 | Stop reason: HOSPADM

## 2018-02-25 RX ORDER — ONDANSETRON 2 MG/ML
4 INJECTION INTRAMUSCULAR; INTRAVENOUS EVERY 6 HOURS PRN
Status: DISCONTINUED | OUTPATIENT
Start: 2018-02-25 | End: 2018-03-05 | Stop reason: HOSPADM

## 2018-02-25 RX ORDER — PROPOFOL 10 MG/ML
VIAL (ML) INTRAVENOUS AS NEEDED
Status: DISCONTINUED | OUTPATIENT
Start: 2018-02-25 | End: 2018-02-25 | Stop reason: SURG

## 2018-02-25 RX ORDER — DILTIAZEM HCL IN NACL,ISO-OSM 125 MG/125
5-15 PLASTIC BAG, INJECTION (ML) INTRAVENOUS
Status: DISCONTINUED | OUTPATIENT
Start: 2018-02-25 | End: 2018-02-27

## 2018-02-25 RX ORDER — ONDANSETRON 4 MG/1
4 TABLET, FILM COATED ORAL EVERY 6 HOURS PRN
Status: DISCONTINUED | OUTPATIENT
Start: 2018-02-25 | End: 2018-02-26

## 2018-02-25 RX ORDER — NALOXONE HCL 0.4 MG/ML
0.1 VIAL (ML) INJECTION
Status: DISCONTINUED | OUTPATIENT
Start: 2018-02-25 | End: 2018-02-26

## 2018-02-25 RX ORDER — SODIUM CHLORIDE 9 MG/ML
125 INJECTION, SOLUTION INTRAVENOUS CONTINUOUS
Status: DISCONTINUED | OUTPATIENT
Start: 2018-02-25 | End: 2018-02-26

## 2018-02-25 RX ORDER — FLUCONAZOLE 2 MG/ML
200 INJECTION, SOLUTION INTRAVENOUS DAILY
Status: COMPLETED | OUTPATIENT
Start: 2018-02-26 | End: 2018-03-02

## 2018-02-25 RX ORDER — SODIUM CHLORIDE, SODIUM LACTATE, POTASSIUM CHLORIDE, CALCIUM CHLORIDE 600; 310; 30; 20 MG/100ML; MG/100ML; MG/100ML; MG/100ML
INJECTION, SOLUTION INTRAVENOUS CONTINUOUS PRN
Status: DISCONTINUED | OUTPATIENT
Start: 2018-02-25 | End: 2018-02-25 | Stop reason: SURG

## 2018-02-25 RX ORDER — SUCCINYLCHOLINE CHLORIDE 20 MG/ML
INJECTION INTRAMUSCULAR; INTRAVENOUS AS NEEDED
Status: DISCONTINUED | OUTPATIENT
Start: 2018-02-25 | End: 2018-02-25 | Stop reason: SURG

## 2018-02-25 RX ORDER — NEOSTIGMINE METHYLSULFATE 1 MG/ML
INJECTION, SOLUTION INTRAVENOUS AS NEEDED
Status: DISCONTINUED | OUTPATIENT
Start: 2018-02-25 | End: 2018-02-25 | Stop reason: SURG

## 2018-02-25 RX ORDER — HYDROMORPHONE HYDROCHLORIDE 1 MG/ML
0.5 INJECTION, SOLUTION INTRAMUSCULAR; INTRAVENOUS; SUBCUTANEOUS
Status: DISCONTINUED | OUTPATIENT
Start: 2018-02-25 | End: 2018-02-26

## 2018-02-25 RX ORDER — PANTOPRAZOLE SODIUM 40 MG/10ML
40 INJECTION, POWDER, LYOPHILIZED, FOR SOLUTION INTRAVENOUS EVERY 12 HOURS SCHEDULED
Status: DISCONTINUED | OUTPATIENT
Start: 2018-02-25 | End: 2018-03-02

## 2018-02-25 RX ORDER — GLYCOPYRROLATE 0.2 MG/ML
INJECTION INTRAMUSCULAR; INTRAVENOUS AS NEEDED
Status: DISCONTINUED | OUTPATIENT
Start: 2018-02-25 | End: 2018-02-25 | Stop reason: SURG

## 2018-02-25 RX ORDER — PHENYLEPHRINE HCL IN 0.9% NACL 0.5 MG/5ML
.5-3 SYRINGE (ML) INTRAVENOUS
Status: DISCONTINUED | OUTPATIENT
Start: 2018-02-25 | End: 2018-02-27

## 2018-02-25 RX ORDER — HEPARIN SODIUM 5000 [USP'U]/ML
5000 INJECTION, SOLUTION INTRAVENOUS; SUBCUTANEOUS EVERY 12 HOURS SCHEDULED
Status: DISCONTINUED | OUTPATIENT
Start: 2018-02-26 | End: 2018-02-26

## 2018-02-25 RX ADMIN — POTASSIUM PHOSPHATE, MONOBASIC AND POTASSIUM PHOSPHATE, DIBASIC 30 MMOL: 224; 236 INJECTION, SOLUTION INTRAVENOUS at 10:36

## 2018-02-25 RX ADMIN — PANTOPRAZOLE SODIUM 40 MG: 40 TABLET, DELAYED RELEASE ORAL at 05:06

## 2018-02-25 RX ADMIN — POTASSIUM CHLORIDE 10 MEQ: 10 INJECTION, SOLUTION INTRAVENOUS at 05:51

## 2018-02-25 RX ADMIN — FENTANYL CITRATE 50 MCG: 50 INJECTION INTRAMUSCULAR; INTRAVENOUS at 23:05

## 2018-02-25 RX ADMIN — DIGOXIN 250 MCG: 0.25 INJECTION INTRAMUSCULAR; INTRAVENOUS at 04:51

## 2018-02-25 RX ADMIN — FENTANYL CITRATE 200 MCG: 50 INJECTION, SOLUTION INTRAMUSCULAR; INTRAVENOUS at 21:27

## 2018-02-25 RX ADMIN — GLYCOPYRROLATE 0.4 MG: 0.2 INJECTION INTRAMUSCULAR; INTRAVENOUS at 22:10

## 2018-02-25 RX ADMIN — POTASSIUM CHLORIDE, DEXTROSE MONOHYDRATE AND SODIUM CHLORIDE 150 ML/HR: 150; 5; 450 INJECTION, SOLUTION INTRAVENOUS at 13:22

## 2018-02-25 RX ADMIN — POTASSIUM CHLORIDE, DEXTROSE MONOHYDRATE AND SODIUM CHLORIDE 150 ML/HR: 150; 5; 450 INJECTION, SOLUTION INTRAVENOUS at 00:48

## 2018-02-25 RX ADMIN — METOPROLOL TARTRATE 3 MG: 5 INJECTION, SOLUTION INTRAVENOUS at 21:50

## 2018-02-25 RX ADMIN — TAZOBACTAM SODIUM AND PIPERACILLIN SODIUM 3.38 G: 375; 3 INJECTION, SOLUTION INTRAVENOUS at 20:51

## 2018-02-25 RX ADMIN — SODIUM CHLORIDE 0.03 UNITS/KG/HR: 9 INJECTION, SOLUTION INTRAVENOUS at 03:54

## 2018-02-25 RX ADMIN — ROCURONIUM BROMIDE 10 MG: 10 SOLUTION INTRAVENOUS at 21:10

## 2018-02-25 RX ADMIN — MORPHINE SULFATE 2 MG: 10 INJECTION INTRAVENOUS at 18:37

## 2018-02-25 RX ADMIN — HEPARIN SODIUM 5000 UNITS: 5000 INJECTION, SOLUTION INTRAVENOUS; SUBCUTANEOUS at 09:29

## 2018-02-25 RX ADMIN — POTASSIUM CHLORIDE 20 MEQ: 750 CAPSULE, EXTENDED RELEASE ORAL at 09:29

## 2018-02-25 RX ADMIN — MAGNESIUM SULFATE HEPTAHYDRATE 4 G: 40 INJECTION, SOLUTION INTRAVENOUS at 09:30

## 2018-02-25 RX ADMIN — SUCCINYLCHOLINE CHLORIDE 186.8 MG: 20 INJECTION, SOLUTION INTRAMUSCULAR; INTRAVENOUS at 21:10

## 2018-02-25 RX ADMIN — ONDANSETRON 4 MG: 2 INJECTION INTRAMUSCULAR; INTRAVENOUS at 22:10

## 2018-02-25 RX ADMIN — ONDANSETRON 4 MG: 4 TABLET, FILM COATED ORAL at 17:13

## 2018-02-25 RX ADMIN — PROPOFOL 150 MG: 10 INJECTION, EMULSION INTRAVENOUS at 21:10

## 2018-02-25 RX ADMIN — POTASSIUM CHLORIDE 10 MEQ: 10 INJECTION, SOLUTION INTRAVENOUS at 01:14

## 2018-02-25 RX ADMIN — ROCURONIUM BROMIDE 20 MG: 10 SOLUTION INTRAVENOUS at 21:20

## 2018-02-25 RX ADMIN — NEOSTIGMINE METHYLSULFATE 2.5 MG: 1 INJECTION, SOLUTION INTRAVENOUS at 22:10

## 2018-02-25 RX ADMIN — FENTANYL CITRATE 50 MCG: 50 INJECTION, SOLUTION INTRAMUSCULAR; INTRAVENOUS at 22:00

## 2018-02-25 RX ADMIN — HYDROMORPHONE HYDROCHLORIDE 0.5 MG: 10 INJECTION INTRAMUSCULAR; INTRAVENOUS; SUBCUTANEOUS at 23:57

## 2018-02-25 RX ADMIN — POTASSIUM CHLORIDE, DEXTROSE MONOHYDRATE AND SODIUM CHLORIDE 150 ML/HR: 150; 5; 450 INJECTION, SOLUTION INTRAVENOUS at 07:21

## 2018-02-25 RX ADMIN — HYDROMORPHONE HYDROCHLORIDE 0.5 MG: 10 INJECTION INTRAMUSCULAR; INTRAVENOUS; SUBCUTANEOUS at 22:35

## 2018-02-25 RX ADMIN — HYDROMORPHONE HYDROCHLORIDE 0.5 MG: 10 INJECTION INTRAMUSCULAR; INTRAVENOUS; SUBCUTANEOUS at 22:55

## 2018-02-25 RX ADMIN — ASPIRIN 81 MG 81 MG: 81 TABLET ORAL at 16:13

## 2018-02-25 RX ADMIN — ALBUMIN HUMAN 250 ML: 0.05 INJECTION, SOLUTION INTRAVENOUS at 04:44

## 2018-02-25 RX ADMIN — HYDROMORPHONE HYDROCHLORIDE 0.5 MG: 10 INJECTION INTRAMUSCULAR; INTRAVENOUS; SUBCUTANEOUS at 18:59

## 2018-02-25 RX ADMIN — OSELTAMIVIR PHOSPHATE 75 MG: 75 CAPSULE ORAL at 05:06

## 2018-02-25 RX ADMIN — DILTIAZEM HYDROCHLORIDE 5 MG/HR: 5 INJECTION INTRAVENOUS at 20:51

## 2018-02-25 RX ADMIN — POTASSIUM CHLORIDE, DEXTROSE MONOHYDRATE AND SODIUM CHLORIDE 150 ML/HR: 150; 5; 450 INJECTION, SOLUTION INTRAVENOUS at 18:44

## 2018-02-25 RX ADMIN — DIGOXIN 250 MCG: 0.25 INJECTION INTRAMUSCULAR; INTRAVENOUS at 03:52

## 2018-02-25 RX ADMIN — SODIUM CHLORIDE, POTASSIUM CHLORIDE, SODIUM LACTATE AND CALCIUM CHLORIDE: 600; 310; 30; 20 INJECTION, SOLUTION INTRAVENOUS at 21:53

## 2018-02-25 RX ADMIN — POTASSIUM CHLORIDE 20 MEQ: 750 CAPSULE, EXTENDED RELEASE ORAL at 16:13

## 2018-02-26 PROBLEM — K26.5 PERFORATED DUODENAL ULCER (HCC): Status: ACTIVE | Noted: 2018-02-26

## 2018-02-26 PROBLEM — A41.9 SEPSIS (HCC): Status: ACTIVE | Noted: 2018-02-26

## 2018-02-26 PROBLEM — I48.3 TYPICAL ATRIAL FLUTTER (HCC): Status: ACTIVE | Noted: 2018-02-26

## 2018-02-26 PROBLEM — I48.3 TYPICAL ATRIAL FLUTTER (HCC): Status: ACTIVE | Noted: 2018-02-25

## 2018-02-26 LAB
ALBUMIN SERPL-MCNC: 3.1 G/DL (ref 3.2–4.8)
ALBUMIN/GLOB SERPL: 1.6 G/DL (ref 1.5–2.5)
ALP SERPL-CCNC: 52 U/L (ref 25–100)
ALT SERPL W P-5'-P-CCNC: 37 U/L (ref 7–40)
ANION GAP SERPL CALCULATED.3IONS-SCNC: 6 MMOL/L (ref 3–11)
AST SERPL-CCNC: 41 U/L (ref 0–33)
BILIRUB SERPL-MCNC: 0.5 MG/DL (ref 0.3–1.2)
BUN BLD-MCNC: 8 MG/DL (ref 9–23)
BUN/CREAT SERPL: 13.3 (ref 7–25)
CALCIUM SPEC-SCNC: 8.2 MG/DL (ref 8.7–10.4)
CHLORIDE SERPL-SCNC: 115 MMOL/L (ref 99–109)
CO2 SERPL-SCNC: 15 MMOL/L (ref 20–31)
CREAT BLD-MCNC: 0.6 MG/DL (ref 0.6–1.3)
GFR SERPL CREATININE-BSD FRML MDRD: 137 ML/MIN/1.73
GLOBULIN UR ELPH-MCNC: 1.9 GM/DL
GLUCOSE BLD-MCNC: 177 MG/DL (ref 70–100)
GLUCOSE BLDC GLUCOMTR-MCNC: 117 MG/DL (ref 70–130)
GLUCOSE BLDC GLUCOMTR-MCNC: 143 MG/DL (ref 70–130)
GLUCOSE BLDC GLUCOMTR-MCNC: 147 MG/DL (ref 70–130)
GLUCOSE BLDC GLUCOMTR-MCNC: 157 MG/DL (ref 70–130)
GLUCOSE BLDC GLUCOMTR-MCNC: 162 MG/DL (ref 70–130)
GLUCOSE BLDC GLUCOMTR-MCNC: 170 MG/DL (ref 70–130)
GLUCOSE BLDC GLUCOMTR-MCNC: 171 MG/DL (ref 70–130)
GLUCOSE BLDC GLUCOMTR-MCNC: 195 MG/DL (ref 70–130)
GLUCOSE BLDC GLUCOMTR-MCNC: 203 MG/DL (ref 70–130)
GLUCOSE BLDC GLUCOMTR-MCNC: 206 MG/DL (ref 70–130)
GLUCOSE BLDC GLUCOMTR-MCNC: 219 MG/DL (ref 70–130)
HBA1C MFR BLD: 12.4 % (ref 4.8–5.6)
MAGNESIUM SERPL-MCNC: 1.9 MG/DL (ref 1.3–2.7)
MAGNESIUM SERPL-MCNC: 2 MG/DL (ref 1.3–2.7)
PHOSPHATE SERPL-MCNC: 1.4 MG/DL (ref 2.4–5.1)
PHOSPHATE SERPL-MCNC: 1.6 MG/DL (ref 2.4–5.1)
POTASSIUM BLD-SCNC: 3.9 MMOL/L (ref 3.5–5.5)
PROCALCITONIN SERPL-MCNC: 0.31 NG/ML
PROT SERPL-MCNC: 5 G/DL (ref 5.7–8.2)
SODIUM BLD-SCNC: 136 MMOL/L (ref 132–146)
TROPONIN I SERPL-MCNC: 0.02 NG/ML

## 2018-02-26 PROCEDURE — 99254 IP/OBS CNSLTJ NEW/EST MOD 60: CPT | Performed by: INTERNAL MEDICINE

## 2018-02-26 PROCEDURE — 83036 HEMOGLOBIN GLYCOSYLATED A1C: CPT | Performed by: INTERNAL MEDICINE

## 2018-02-26 PROCEDURE — 83735 ASSAY OF MAGNESIUM: CPT | Performed by: INTERNAL MEDICINE

## 2018-02-26 PROCEDURE — 82962 GLUCOSE BLOOD TEST: CPT

## 2018-02-26 PROCEDURE — 84145 PROCALCITONIN (PCT): CPT | Performed by: INTERNAL MEDICINE

## 2018-02-26 PROCEDURE — 25010000003 POTASSIUM CHLORIDE 10 MEQ/100ML SOLUTION: Performed by: EMERGENCY MEDICINE

## 2018-02-26 PROCEDURE — 25010000002 HYDROMORPHONE PER 4 MG: Performed by: NURSE PRACTITIONER

## 2018-02-26 PROCEDURE — 25010000002 PIPERACILLIN SOD-TAZOBACTAM PER 1 G: Performed by: INTERNAL MEDICINE

## 2018-02-26 PROCEDURE — 87040 BLOOD CULTURE FOR BACTERIA: CPT | Performed by: INTERNAL MEDICINE

## 2018-02-26 PROCEDURE — 25010000002 PIPERACILLIN SOD-TAZOBACTAM PER 1 G: Performed by: SURGERY

## 2018-02-26 PROCEDURE — 25010000002 DIGOXIN PER 500 MCG: Performed by: NURSE PRACTITIONER

## 2018-02-26 PROCEDURE — 84100 ASSAY OF PHOSPHORUS: CPT | Performed by: INTERNAL MEDICINE

## 2018-02-26 PROCEDURE — 84484 ASSAY OF TROPONIN QUANT: CPT | Performed by: INTERNAL MEDICINE

## 2018-02-26 PROCEDURE — 25010000002 HYDROMORPHONE PER 4 MG: Performed by: SURGERY

## 2018-02-26 PROCEDURE — 25010000002 FLUCONAZOLE PER 200 MG: Performed by: SURGERY

## 2018-02-26 PROCEDURE — 99233 SBSQ HOSP IP/OBS HIGH 50: CPT | Performed by: INTERNAL MEDICINE

## 2018-02-26 PROCEDURE — 80053 COMPREHEN METABOLIC PANEL: CPT | Performed by: SURGERY

## 2018-02-26 RX ORDER — MAGNESIUM SULFATE HEPTAHYDRATE 40 MG/ML
4 INJECTION, SOLUTION INTRAVENOUS AS NEEDED
Status: DISCONTINUED | OUTPATIENT
Start: 2018-02-26 | End: 2018-03-05 | Stop reason: HOSPADM

## 2018-02-26 RX ORDER — DIGOXIN 0.25 MG/ML
500 INJECTION INTRAMUSCULAR; INTRAVENOUS ONCE
Status: COMPLETED | OUTPATIENT
Start: 2018-02-26 | End: 2018-02-26

## 2018-02-26 RX ORDER — MAGNESIUM SULFATE HEPTAHYDRATE 40 MG/ML
6 INJECTION, SOLUTION INTRAVENOUS AS NEEDED
Status: DISCONTINUED | OUTPATIENT
Start: 2018-02-26 | End: 2018-03-05 | Stop reason: HOSPADM

## 2018-02-26 RX ORDER — NALOXONE HCL 0.4 MG/ML
0.1 VIAL (ML) INJECTION
Status: DISCONTINUED | OUTPATIENT
Start: 2018-02-26 | End: 2018-02-26

## 2018-02-26 RX ADMIN — HYDROMORPHONE HYDROCHLORIDE 0.5 MG: 10 INJECTION INTRAMUSCULAR; INTRAVENOUS; SUBCUTANEOUS at 20:12

## 2018-02-26 RX ADMIN — METOPROLOL TARTRATE 5 MG: 5 INJECTION, SOLUTION INTRAVENOUS at 02:22

## 2018-02-26 RX ADMIN — FLUCONAZOLE 200 MG: 2 INJECTION INTRAVENOUS at 08:26

## 2018-02-26 RX ADMIN — MAGNESIUM SULFATE HEPTAHYDRATE 4 G: 40 INJECTION, SOLUTION INTRAVENOUS at 18:32

## 2018-02-26 RX ADMIN — HYDROMORPHONE HYDROCHLORIDE 0.5 MG: 10 INJECTION INTRAMUSCULAR; INTRAVENOUS; SUBCUTANEOUS at 04:03

## 2018-02-26 RX ADMIN — PANTOPRAZOLE SODIUM 40 MG: 40 INJECTION, POWDER, FOR SOLUTION INTRAVENOUS at 01:04

## 2018-02-26 RX ADMIN — POTASSIUM PHOSPHATE, MONOBASIC AND POTASSIUM PHOSPHATE, DIBASIC 30 MMOL: 224; 236 INJECTION, SOLUTION INTRAVENOUS at 20:07

## 2018-02-26 RX ADMIN — PANTOPRAZOLE SODIUM 40 MG: 40 INJECTION, POWDER, FOR SOLUTION INTRAVENOUS at 20:10

## 2018-02-26 RX ADMIN — SODIUM CHLORIDE 125 ML/HR: 9 INJECTION, SOLUTION INTRAVENOUS at 01:05

## 2018-02-26 RX ADMIN — POTASSIUM CHLORIDE, DEXTROSE MONOHYDRATE AND SODIUM CHLORIDE 150 ML/HR: 150; 5; 450 INJECTION, SOLUTION INTRAVENOUS at 02:26

## 2018-02-26 RX ADMIN — POTASSIUM CHLORIDE 10 MEQ: 10 INJECTION, SOLUTION INTRAVENOUS at 01:05

## 2018-02-26 RX ADMIN — HYDROMORPHONE HYDROCHLORIDE 0.5 MG: 10 INJECTION INTRAMUSCULAR; INTRAVENOUS; SUBCUTANEOUS at 05:29

## 2018-02-26 RX ADMIN — POTASSIUM CHLORIDE 10 MEQ: 10 INJECTION, SOLUTION INTRAVENOUS at 02:23

## 2018-02-26 RX ADMIN — TAZOBACTAM SODIUM AND PIPERACILLIN SODIUM 4.5 G: 500; 4 INJECTION, SOLUTION INTRAVENOUS at 04:03

## 2018-02-26 RX ADMIN — TAZOBACTAM SODIUM AND PIPERACILLIN SODIUM 4.5 G: 500; 4 INJECTION, SOLUTION INTRAVENOUS at 21:57

## 2018-02-26 RX ADMIN — HYDROMORPHONE HYDROCHLORIDE 0.5 MG: 10 INJECTION INTRAMUSCULAR; INTRAVENOUS; SUBCUTANEOUS at 14:24

## 2018-02-26 RX ADMIN — TAZOBACTAM SODIUM AND PIPERACILLIN SODIUM 4.5 G: 500; 4 INJECTION, SOLUTION INTRAVENOUS at 12:21

## 2018-02-26 RX ADMIN — HYDROMORPHONE HYDROCHLORIDE 0.5 MG: 10 INJECTION INTRAMUSCULAR; INTRAVENOUS; SUBCUTANEOUS at 17:34

## 2018-02-26 RX ADMIN — POTASSIUM PHOSPHATE, MONOBASIC AND POTASSIUM PHOSPHATE, DIBASIC 30 MMOL: 224; 236 INJECTION, SOLUTION INTRAVENOUS at 04:12

## 2018-02-26 RX ADMIN — HYDROMORPHONE HYDROCHLORIDE 0.5 MG: 10 INJECTION INTRAMUSCULAR; INTRAVENOUS; SUBCUTANEOUS at 08:25

## 2018-02-26 RX ADMIN — HYDROMORPHONE HYDROCHLORIDE 0.5 MG: 10 INJECTION INTRAMUSCULAR; INTRAVENOUS; SUBCUTANEOUS at 12:25

## 2018-02-26 RX ADMIN — PANTOPRAZOLE SODIUM 40 MG: 40 INJECTION, POWDER, FOR SOLUTION INTRAVENOUS at 08:25

## 2018-02-26 RX ADMIN — DIGOXIN 500 MCG: 0.25 INJECTION INTRAMUSCULAR; INTRAVENOUS at 14:21

## 2018-02-27 ENCOUNTER — APPOINTMENT (OUTPATIENT)
Dept: GENERAL RADIOLOGY | Facility: HOSPITAL | Age: 63
End: 2018-02-27

## 2018-02-27 LAB
ALBUMIN SERPL-MCNC: 3 G/DL (ref 3.2–4.8)
ANION GAP SERPL CALCULATED.3IONS-SCNC: 10 MMOL/L (ref 3–11)
BASOPHILS # BLD AUTO: 0.02 10*3/MM3 (ref 0–0.2)
BASOPHILS NFR BLD AUTO: 0.2 % (ref 0–1)
BUN BLD-MCNC: 7 MG/DL (ref 9–23)
BUN/CREAT SERPL: 11.7 (ref 7–25)
CALCIUM SPEC-SCNC: 8.4 MG/DL (ref 8.7–10.4)
CHLORIDE SERPL-SCNC: 113 MMOL/L (ref 99–109)
CO2 SERPL-SCNC: 19 MMOL/L (ref 20–31)
CREAT BLD-MCNC: 0.6 MG/DL (ref 0.6–1.3)
CYTO UR: NORMAL
DEPRECATED RDW RBC AUTO: 42.4 FL (ref 37–54)
EOSINOPHIL # BLD AUTO: 0 10*3/MM3 (ref 0–0.3)
EOSINOPHIL NFR BLD AUTO: 0 % (ref 0–3)
ERYTHROCYTE [DISTWIDTH] IN BLOOD BY AUTOMATED COUNT: 13.8 % (ref 11.3–14.5)
EST. AVERAGE GLUCOSE BLD GHB EST-MCNC: 355 MG/DL
GFR SERPL CREATININE-BSD FRML MDRD: 137 ML/MIN/1.73
GLUCOSE BLD-MCNC: 179 MG/DL (ref 70–100)
GLUCOSE BLDC GLUCOMTR-MCNC: 108 MG/DL (ref 70–130)
GLUCOSE BLDC GLUCOMTR-MCNC: 143 MG/DL (ref 70–130)
GLUCOSE BLDC GLUCOMTR-MCNC: 152 MG/DL (ref 70–130)
GLUCOSE BLDC GLUCOMTR-MCNC: 154 MG/DL (ref 70–130)
GLUCOSE BLDC GLUCOMTR-MCNC: 159 MG/DL (ref 70–130)
GLUCOSE BLDC GLUCOMTR-MCNC: 171 MG/DL (ref 70–130)
GLUCOSE BLDC GLUCOMTR-MCNC: 174 MG/DL (ref 70–130)
GLUCOSE BLDC GLUCOMTR-MCNC: 189 MG/DL (ref 70–130)
GLUCOSE BLDC GLUCOMTR-MCNC: 194 MG/DL (ref 70–130)
GLUCOSE BLDC GLUCOMTR-MCNC: 214 MG/DL (ref 70–130)
GLUCOSE BLDC GLUCOMTR-MCNC: 223 MG/DL (ref 70–130)
HBA1C MFR BLD: 14 % (ref 4.8–5.6)
HCT VFR BLD AUTO: 42.6 % (ref 38.9–50.9)
HGB BLD-MCNC: 14.3 G/DL (ref 13.1–17.5)
IMM GRANULOCYTES # BLD: 0.04 10*3/MM3 (ref 0–0.03)
IMM GRANULOCYTES NFR BLD: 0.4 % (ref 0–0.6)
LAB AP CASE REPORT: NORMAL
LAB AP CLINICAL INFORMATION: NORMAL
LYMPHOCYTES # BLD AUTO: 0.87 10*3/MM3 (ref 0.6–4.8)
LYMPHOCYTES NFR BLD AUTO: 8 % (ref 24–44)
Lab: NORMAL
MAGNESIUM SERPL-MCNC: 2.5 MG/DL (ref 1.3–2.7)
MCH RBC QN AUTO: 28.5 PG (ref 27–31)
MCHC RBC AUTO-ENTMCNC: 33.6 G/DL (ref 32–36)
MCV RBC AUTO: 85 FL (ref 80–99)
MONOCYTES # BLD AUTO: 0.83 10*3/MM3 (ref 0–1)
MONOCYTES NFR BLD AUTO: 7.6 % (ref 0–12)
NEUTROPHILS # BLD AUTO: 9.15 10*3/MM3 (ref 1.5–8.3)
NEUTROPHILS NFR BLD AUTO: 83.8 % (ref 41–71)
PATH REPORT.FINAL DX SPEC: NORMAL
PATH REPORT.GROSS SPEC: NORMAL
PHOSPHATE SERPL-MCNC: 2.8 MG/DL (ref 2.4–5.1)
PLATELET # BLD AUTO: 194 10*3/MM3 (ref 150–450)
PMV BLD AUTO: 10.8 FL (ref 6–12)
POTASSIUM BLD-SCNC: 4.3 MMOL/L (ref 3.5–5.5)
RBC # BLD AUTO: 5.01 10*6/MM3 (ref 4.2–5.76)
SODIUM BLD-SCNC: 142 MMOL/L (ref 132–146)
WBC NRBC COR # BLD: 10.91 10*3/MM3 (ref 3.5–10.8)

## 2018-02-27 PROCEDURE — 25010000002 FLUCONAZOLE PER 200 MG: Performed by: SURGERY

## 2018-02-27 PROCEDURE — 82962 GLUCOSE BLOOD TEST: CPT

## 2018-02-27 PROCEDURE — 83735 ASSAY OF MAGNESIUM: CPT | Performed by: INTERNAL MEDICINE

## 2018-02-27 PROCEDURE — 99233 SBSQ HOSP IP/OBS HIGH 50: CPT | Performed by: INTERNAL MEDICINE

## 2018-02-27 PROCEDURE — 71045 X-RAY EXAM CHEST 1 VIEW: CPT

## 2018-02-27 PROCEDURE — 25010000002 HYDROMORPHONE PER 4 MG: Performed by: NURSE PRACTITIONER

## 2018-02-27 PROCEDURE — 94799 UNLISTED PULMONARY SVC/PX: CPT

## 2018-02-27 PROCEDURE — 99232 SBSQ HOSP IP/OBS MODERATE 35: CPT | Performed by: INTERNAL MEDICINE

## 2018-02-27 PROCEDURE — G0108 DIAB MANAGE TRN  PER INDIV: HCPCS

## 2018-02-27 PROCEDURE — 80069 RENAL FUNCTION PANEL: CPT | Performed by: INTERNAL MEDICINE

## 2018-02-27 PROCEDURE — 25010000002 PIPERACILLIN SOD-TAZOBACTAM PER 1 G: Performed by: INTERNAL MEDICINE

## 2018-02-27 PROCEDURE — 85025 COMPLETE CBC W/AUTO DIFF WBC: CPT | Performed by: INTERNAL MEDICINE

## 2018-02-27 RX ORDER — POTASSIUM CHLORIDE 7.45 MG/ML
10 INJECTION INTRAVENOUS
Status: DISCONTINUED | OUTPATIENT
Start: 2018-02-27 | End: 2018-03-02 | Stop reason: ALTCHOICE

## 2018-02-27 RX ORDER — DEXTROSE AND SODIUM CHLORIDE 5; .45 G/100ML; G/100ML
80 INJECTION, SOLUTION INTRAVENOUS CONTINUOUS
Status: DISCONTINUED | OUTPATIENT
Start: 2018-02-27 | End: 2018-03-01

## 2018-02-27 RX ADMIN — HYDROMORPHONE HYDROCHLORIDE 0.5 MG: 10 INJECTION INTRAMUSCULAR; INTRAVENOUS; SUBCUTANEOUS at 20:26

## 2018-02-27 RX ADMIN — DEXTROSE AND SODIUM CHLORIDE 80 ML/HR: 5; 450 INJECTION, SOLUTION INTRAVENOUS at 09:42

## 2018-02-27 RX ADMIN — DEXTROSE AND SODIUM CHLORIDE 80 ML/HR: 5; 450 INJECTION, SOLUTION INTRAVENOUS at 22:10

## 2018-02-27 RX ADMIN — TAZOBACTAM SODIUM AND PIPERACILLIN SODIUM 4.5 G: 500; 4 INJECTION, SOLUTION INTRAVENOUS at 20:02

## 2018-02-27 RX ADMIN — HYDROMORPHONE HYDROCHLORIDE 0.5 MG: 10 INJECTION INTRAMUSCULAR; INTRAVENOUS; SUBCUTANEOUS at 15:06

## 2018-02-27 RX ADMIN — FLUCONAZOLE 200 MG: 2 INJECTION INTRAVENOUS at 08:53

## 2018-02-27 RX ADMIN — SODIUM CHLORIDE 6.8 UNITS/HR: 9 INJECTION, SOLUTION INTRAVENOUS at 20:02

## 2018-02-27 RX ADMIN — PANTOPRAZOLE SODIUM 40 MG: 40 INJECTION, POWDER, FOR SOLUTION INTRAVENOUS at 08:02

## 2018-02-27 RX ADMIN — PANTOPRAZOLE SODIUM 40 MG: 40 INJECTION, POWDER, FOR SOLUTION INTRAVENOUS at 20:02

## 2018-02-27 RX ADMIN — HYDROMORPHONE HYDROCHLORIDE 0.5 MG: 10 INJECTION INTRAMUSCULAR; INTRAVENOUS; SUBCUTANEOUS at 04:34

## 2018-02-27 RX ADMIN — HYDROMORPHONE HYDROCHLORIDE 0.5 MG: 10 INJECTION INTRAMUSCULAR; INTRAVENOUS; SUBCUTANEOUS at 12:07

## 2018-02-27 RX ADMIN — TAZOBACTAM SODIUM AND PIPERACILLIN SODIUM 4.5 G: 500; 4 INJECTION, SOLUTION INTRAVENOUS at 04:28

## 2018-02-27 RX ADMIN — TAZOBACTAM SODIUM AND PIPERACILLIN SODIUM 4.5 G: 500; 4 INJECTION, SOLUTION INTRAVENOUS at 11:48

## 2018-02-28 ENCOUNTER — APPOINTMENT (OUTPATIENT)
Dept: GENERAL RADIOLOGY | Facility: HOSPITAL | Age: 63
End: 2018-02-28

## 2018-02-28 LAB
ALBUMIN SERPL-MCNC: 2.8 G/DL (ref 3.2–4.8)
ALBUMIN/GLOB SERPL: 1.6 G/DL (ref 1.5–2.5)
ALP SERPL-CCNC: 61 U/L (ref 25–100)
ALT SERPL W P-5'-P-CCNC: 25 U/L (ref 7–40)
ANION GAP SERPL CALCULATED.3IONS-SCNC: 9 MMOL/L (ref 3–11)
AST SERPL-CCNC: 23 U/L (ref 0–33)
BASOPHILS # BLD AUTO: 0.02 10*3/MM3 (ref 0–0.2)
BASOPHILS NFR BLD AUTO: 0.2 % (ref 0–1)
BILIRUB SERPL-MCNC: 1 MG/DL (ref 0.3–1.2)
BUN BLD-MCNC: 7 MG/DL (ref 9–23)
BUN/CREAT SERPL: 11.7 (ref 7–25)
CALCIUM SPEC-SCNC: 8.4 MG/DL (ref 8.7–10.4)
CHLORIDE SERPL-SCNC: 110 MMOL/L (ref 99–109)
CO2 SERPL-SCNC: 24 MMOL/L (ref 20–31)
CREAT BLD-MCNC: 0.6 MG/DL (ref 0.6–1.3)
DEPRECATED RDW RBC AUTO: 41.4 FL (ref 37–54)
EOSINOPHIL # BLD AUTO: 0.03 10*3/MM3 (ref 0–0.3)
EOSINOPHIL NFR BLD AUTO: 0.3 % (ref 0–3)
ERYTHROCYTE [DISTWIDTH] IN BLOOD BY AUTOMATED COUNT: 13.6 % (ref 11.3–14.5)
GFR SERPL CREATININE-BSD FRML MDRD: 137 ML/MIN/1.73
GLOBULIN UR ELPH-MCNC: 1.8 GM/DL
GLUCOSE BLD-MCNC: 148 MG/DL (ref 70–100)
GLUCOSE BLDC GLUCOMTR-MCNC: 107 MG/DL (ref 70–130)
GLUCOSE BLDC GLUCOMTR-MCNC: 120 MG/DL (ref 70–130)
GLUCOSE BLDC GLUCOMTR-MCNC: 124 MG/DL (ref 70–130)
GLUCOSE BLDC GLUCOMTR-MCNC: 132 MG/DL (ref 70–130)
GLUCOSE BLDC GLUCOMTR-MCNC: 145 MG/DL (ref 70–130)
GLUCOSE BLDC GLUCOMTR-MCNC: 165 MG/DL (ref 70–130)
GLUCOSE BLDC GLUCOMTR-MCNC: 177 MG/DL (ref 70–130)
GLUCOSE BLDC GLUCOMTR-MCNC: 201 MG/DL (ref 70–130)
GLUCOSE BLDC GLUCOMTR-MCNC: 206 MG/DL (ref 70–130)
HCT VFR BLD AUTO: 39.9 % (ref 38.9–50.9)
HGB BLD-MCNC: 13.5 G/DL (ref 13.1–17.5)
IMM GRANULOCYTES # BLD: 0.04 10*3/MM3 (ref 0–0.03)
IMM GRANULOCYTES NFR BLD: 0.4 % (ref 0–0.6)
LYMPHOCYTES # BLD AUTO: 1.39 10*3/MM3 (ref 0.6–4.8)
LYMPHOCYTES NFR BLD AUTO: 12.8 % (ref 24–44)
MAGNESIUM SERPL-MCNC: 1.8 MG/DL (ref 1.3–2.7)
MCH RBC QN AUTO: 28.4 PG (ref 27–31)
MCHC RBC AUTO-ENTMCNC: 33.8 G/DL (ref 32–36)
MCV RBC AUTO: 83.8 FL (ref 80–99)
MONOCYTES # BLD AUTO: 1 10*3/MM3 (ref 0–1)
MONOCYTES NFR BLD AUTO: 9.2 % (ref 0–12)
NEUTROPHILS # BLD AUTO: 8.4 10*3/MM3 (ref 1.5–8.3)
NEUTROPHILS NFR BLD AUTO: 77.1 % (ref 41–71)
PHOSPHATE SERPL-MCNC: 2.6 MG/DL (ref 2.4–5.1)
PLATELET # BLD AUTO: 230 10*3/MM3 (ref 150–450)
PMV BLD AUTO: 10.4 FL (ref 6–12)
POTASSIUM BLD-SCNC: 3 MMOL/L (ref 3.5–5.5)
POTASSIUM BLD-SCNC: 3.5 MMOL/L (ref 3.5–5.5)
PROT SERPL-MCNC: 4.6 G/DL (ref 5.7–8.2)
RBC # BLD AUTO: 4.76 10*6/MM3 (ref 4.2–5.76)
SODIUM BLD-SCNC: 143 MMOL/L (ref 132–146)
WBC NRBC COR # BLD: 10.88 10*3/MM3 (ref 3.5–10.8)

## 2018-02-28 PROCEDURE — 83735 ASSAY OF MAGNESIUM: CPT | Performed by: INTERNAL MEDICINE

## 2018-02-28 PROCEDURE — 74245: CPT

## 2018-02-28 PROCEDURE — 25010000002 HYDROMORPHONE PER 4 MG: Performed by: NURSE PRACTITIONER

## 2018-02-28 PROCEDURE — 80053 COMPREHEN METABOLIC PANEL: CPT | Performed by: INTERNAL MEDICINE

## 2018-02-28 PROCEDURE — 84100 ASSAY OF PHOSPHORUS: CPT | Performed by: INTERNAL MEDICINE

## 2018-02-28 PROCEDURE — 82962 GLUCOSE BLOOD TEST: CPT

## 2018-02-28 PROCEDURE — 25010000002 FLUCONAZOLE PER 200 MG: Performed by: SURGERY

## 2018-02-28 PROCEDURE — 99233 SBSQ HOSP IP/OBS HIGH 50: CPT | Performed by: INTERNAL MEDICINE

## 2018-02-28 PROCEDURE — 0 DIATRIZOATE MEGLUMINE & SODIUM PER 1 ML: Performed by: INTERNAL MEDICINE

## 2018-02-28 PROCEDURE — 25010000003 POTASSIUM CHLORIDE 10 MEQ/100ML SOLUTION: Performed by: INTERNAL MEDICINE

## 2018-02-28 PROCEDURE — 25010000003 POTASSIUM CHLORIDE 20 MEQ/250ML SOLUTION: Performed by: INTERNAL MEDICINE

## 2018-02-28 PROCEDURE — 99232 SBSQ HOSP IP/OBS MODERATE 35: CPT | Performed by: INTERNAL MEDICINE

## 2018-02-28 PROCEDURE — 63710000001 INSULIN DETEMIR PER 5 UNITS: Performed by: INTERNAL MEDICINE

## 2018-02-28 PROCEDURE — 85025 COMPLETE CBC W/AUTO DIFF WBC: CPT | Performed by: INTERNAL MEDICINE

## 2018-02-28 PROCEDURE — 84132 ASSAY OF SERUM POTASSIUM: CPT | Performed by: INTERNAL MEDICINE

## 2018-02-28 PROCEDURE — 25010000002 PIPERACILLIN SOD-TAZOBACTAM PER 1 G: Performed by: INTERNAL MEDICINE

## 2018-02-28 PROCEDURE — 63710000001 INSULIN LISPRO (HUMAN) PER 5 UNITS: Performed by: INTERNAL MEDICINE

## 2018-02-28 RX ADMIN — HYDROMORPHONE HYDROCHLORIDE 0.5 MG: 10 INJECTION INTRAMUSCULAR; INTRAVENOUS; SUBCUTANEOUS at 09:27

## 2018-02-28 RX ADMIN — FLUCONAZOLE 200 MG: 2 INJECTION INTRAVENOUS at 08:26

## 2018-02-28 RX ADMIN — POTASSIUM CHLORIDE 10 MEQ: 10 INJECTION, SOLUTION INTRAVENOUS at 08:26

## 2018-02-28 RX ADMIN — INSULIN LISPRO 4 UNITS: 100 INJECTION, SOLUTION INTRAVENOUS; SUBCUTANEOUS at 20:48

## 2018-02-28 RX ADMIN — TAZOBACTAM SODIUM AND PIPERACILLIN SODIUM 4.5 G: 500; 4 INJECTION, SOLUTION INTRAVENOUS at 19:56

## 2018-02-28 RX ADMIN — DIATRIZOATE MEGLUMINE AND DIATRIZOATE SODIUM 240 ML: 660; 100 LIQUID ORAL; RECTAL at 10:29

## 2018-02-28 RX ADMIN — INSULIN DETEMIR 15 UNITS: 100 INJECTION, SOLUTION SUBCUTANEOUS at 14:25

## 2018-02-28 RX ADMIN — PANTOPRAZOLE SODIUM 40 MG: 40 INJECTION, POWDER, FOR SOLUTION INTRAVENOUS at 08:27

## 2018-02-28 RX ADMIN — TAZOBACTAM SODIUM AND PIPERACILLIN SODIUM 4.5 G: 500; 4 INJECTION, SOLUTION INTRAVENOUS at 12:28

## 2018-02-28 RX ADMIN — MAGNESIUM SULFATE HEPTAHYDRATE 4 G: 40 INJECTION, SOLUTION INTRAVENOUS at 06:32

## 2018-02-28 RX ADMIN — TAZOBACTAM SODIUM AND PIPERACILLIN SODIUM 4.5 G: 500; 4 INJECTION, SOLUTION INTRAVENOUS at 04:01

## 2018-02-28 RX ADMIN — INSULIN LISPRO 2 UNITS: 100 INJECTION, SOLUTION INTRAVENOUS; SUBCUTANEOUS at 17:24

## 2018-02-28 RX ADMIN — HYDROMORPHONE HYDROCHLORIDE 0.5 MG: 10 INJECTION INTRAMUSCULAR; INTRAVENOUS; SUBCUTANEOUS at 02:01

## 2018-02-28 RX ADMIN — INSULIN DETEMIR 15 UNITS: 100 INJECTION, SOLUTION SUBCUTANEOUS at 20:48

## 2018-02-28 RX ADMIN — PANTOPRAZOLE SODIUM 40 MG: 40 INJECTION, POWDER, FOR SOLUTION INTRAVENOUS at 20:02

## 2018-02-28 RX ADMIN — POTASSIUM CHLORIDE 20 MEQ: 149 INJECTION, SOLUTION, CONCENTRATE INTRAVENOUS at 15:57

## 2018-03-01 LAB
ANION GAP SERPL CALCULATED.3IONS-SCNC: 9 MMOL/L (ref 3–11)
APTT PPP: 29.2 SECONDS (ref 55–70)
APTT PPP: 47.1 SECONDS (ref 55–70)
BACTERIA SPEC AEROBE CULT: NORMAL
BACTERIA SPEC AEROBE CULT: NORMAL
BASOPHILS # BLD AUTO: 0.03 10*3/MM3 (ref 0–0.2)
BASOPHILS NFR BLD AUTO: 0.3 % (ref 0–1)
BUN BLD-MCNC: 6 MG/DL (ref 9–23)
BUN/CREAT SERPL: 10 (ref 7–25)
CALCIUM SPEC-SCNC: 8.2 MG/DL (ref 8.7–10.4)
CHLORIDE SERPL-SCNC: 109 MMOL/L (ref 99–109)
CO2 SERPL-SCNC: 26 MMOL/L (ref 20–31)
CREAT BLD-MCNC: 0.6 MG/DL (ref 0.6–1.3)
DEPRECATED RDW RBC AUTO: 40.4 FL (ref 37–54)
EOSINOPHIL # BLD AUTO: 0.17 10*3/MM3 (ref 0–0.3)
EOSINOPHIL NFR BLD AUTO: 1.8 % (ref 0–3)
ERYTHROCYTE [DISTWIDTH] IN BLOOD BY AUTOMATED COUNT: 13.3 % (ref 11.3–14.5)
GFR SERPL CREATININE-BSD FRML MDRD: 137 ML/MIN/1.73
GLUCOSE BLD-MCNC: 63 MG/DL (ref 70–100)
GLUCOSE BLDC GLUCOMTR-MCNC: 218 MG/DL (ref 70–130)
GLUCOSE BLDC GLUCOMTR-MCNC: 223 MG/DL (ref 70–130)
GLUCOSE BLDC GLUCOMTR-MCNC: 225 MG/DL (ref 70–130)
GLUCOSE BLDC GLUCOMTR-MCNC: 54 MG/DL (ref 70–130)
GLUCOSE BLDC GLUCOMTR-MCNC: 99 MG/DL (ref 70–130)
HCT VFR BLD AUTO: 39.3 % (ref 38.9–50.9)
HGB BLD-MCNC: 13.4 G/DL (ref 13.1–17.5)
IMM GRANULOCYTES # BLD: 0.1 10*3/MM3 (ref 0–0.03)
IMM GRANULOCYTES NFR BLD: 1 % (ref 0–0.6)
INR PPP: 1.07 (ref 0.91–1.09)
LYMPHOCYTES # BLD AUTO: 1.46 10*3/MM3 (ref 0.6–4.8)
LYMPHOCYTES NFR BLD AUTO: 15.1 % (ref 24–44)
MAGNESIUM SERPL-MCNC: 1.8 MG/DL (ref 1.3–2.7)
MCH RBC QN AUTO: 28.6 PG (ref 27–31)
MCHC RBC AUTO-ENTMCNC: 34.1 G/DL (ref 32–36)
MCV RBC AUTO: 84 FL (ref 80–99)
MONOCYTES # BLD AUTO: 1.24 10*3/MM3 (ref 0–1)
MONOCYTES NFR BLD AUTO: 12.8 % (ref 0–12)
NEUTROPHILS # BLD AUTO: 6.68 10*3/MM3 (ref 1.5–8.3)
NEUTROPHILS NFR BLD AUTO: 69 % (ref 41–71)
PLATELET # BLD AUTO: 326 10*3/MM3 (ref 150–450)
PMV BLD AUTO: 9.7 FL (ref 6–12)
POTASSIUM BLD-SCNC: 3.1 MMOL/L (ref 3.5–5.5)
PROTHROMBIN TIME: 11.2 SECONDS (ref 9.6–11.5)
RBC # BLD AUTO: 4.68 10*6/MM3 (ref 4.2–5.76)
SODIUM BLD-SCNC: 144 MMOL/L (ref 132–146)
WBC NRBC COR # BLD: 9.68 10*3/MM3 (ref 3.5–10.8)

## 2018-03-01 PROCEDURE — 85025 COMPLETE CBC W/AUTO DIFF WBC: CPT | Performed by: INTERNAL MEDICINE

## 2018-03-01 PROCEDURE — 99232 SBSQ HOSP IP/OBS MODERATE 35: CPT | Performed by: INTERNAL MEDICINE

## 2018-03-01 PROCEDURE — 25010000003 POTASSIUM CHLORIDE 20 MEQ/250ML SOLUTION: Performed by: INTERNAL MEDICINE

## 2018-03-01 PROCEDURE — 85730 THROMBOPLASTIN TIME PARTIAL: CPT

## 2018-03-01 PROCEDURE — 25010000002 PIPERACILLIN SOD-TAZOBACTAM PER 1 G: Performed by: INTERNAL MEDICINE

## 2018-03-01 PROCEDURE — 82962 GLUCOSE BLOOD TEST: CPT

## 2018-03-01 PROCEDURE — 63710000001 INSULIN DETEMIR PER 5 UNITS: Performed by: INTERNAL MEDICINE

## 2018-03-01 PROCEDURE — 83735 ASSAY OF MAGNESIUM: CPT | Performed by: INTERNAL MEDICINE

## 2018-03-01 PROCEDURE — 25010000002 HEPARIN (PORCINE) PER 1000 UNITS

## 2018-03-01 PROCEDURE — 85610 PROTHROMBIN TIME: CPT

## 2018-03-01 PROCEDURE — 63710000001 INSULIN DETEMIR PER 5 UNITS

## 2018-03-01 PROCEDURE — 25010000002 FLUCONAZOLE PER 200 MG: Performed by: SURGERY

## 2018-03-01 PROCEDURE — 80048 BASIC METABOLIC PNL TOTAL CA: CPT | Performed by: INTERNAL MEDICINE

## 2018-03-01 RX ORDER — HEPARIN SODIUM 1000 [USP'U]/ML
45.5 INJECTION, SOLUTION INTRAVENOUS; SUBCUTANEOUS ONCE
Status: COMPLETED | OUTPATIENT
Start: 2018-03-01 | End: 2018-03-01

## 2018-03-01 RX ORDER — DEXTROSE MONOHYDRATE 25 G/50ML
INJECTION, SOLUTION INTRAVENOUS
Status: COMPLETED
Start: 2018-03-01 | End: 2018-03-01

## 2018-03-01 RX ORDER — HEPARIN SODIUM 1000 [USP'U]/ML
5000 INJECTION, SOLUTION INTRAVENOUS; SUBCUTANEOUS ONCE
Status: COMPLETED | OUTPATIENT
Start: 2018-03-01 | End: 2018-03-01

## 2018-03-01 RX ADMIN — FLUCONAZOLE 200 MG: 2 INJECTION INTRAVENOUS at 08:07

## 2018-03-01 RX ADMIN — HEPARIN SODIUM 11 UNITS/KG/HR: 10000 INJECTION, SOLUTION INTRAVENOUS at 11:11

## 2018-03-01 RX ADMIN — INSULIN DETEMIR 8 UNITS: 100 INJECTION, SOLUTION SUBCUTANEOUS at 09:47

## 2018-03-01 RX ADMIN — TAZOBACTAM SODIUM AND PIPERACILLIN SODIUM 4.5 G: 500; 4 INJECTION, SOLUTION INTRAVENOUS at 05:09

## 2018-03-01 RX ADMIN — POTASSIUM CHLORIDE 20 MEQ: 149 INJECTION, SOLUTION, CONCENTRATE INTRAVENOUS at 11:06

## 2018-03-01 RX ADMIN — HEPARIN SODIUM 5000 UNITS: 1000 INJECTION, SOLUTION INTRAVENOUS; SUBCUTANEOUS at 19:45

## 2018-03-01 RX ADMIN — TAZOBACTAM SODIUM AND PIPERACILLIN SODIUM 4.5 G: 500; 4 INJECTION, SOLUTION INTRAVENOUS at 12:09

## 2018-03-01 RX ADMIN — HEPARIN SODIUM 4000 UNITS: 1000 INJECTION, SOLUTION INTRAVENOUS; SUBCUTANEOUS at 11:11

## 2018-03-01 RX ADMIN — INSULIN LISPRO 4 UNITS: 100 INJECTION, SOLUTION INTRAVENOUS; SUBCUTANEOUS at 12:09

## 2018-03-01 RX ADMIN — PANTOPRAZOLE SODIUM 40 MG: 40 INJECTION, POWDER, FOR SOLUTION INTRAVENOUS at 20:27

## 2018-03-01 RX ADMIN — DEXTROSE MONOHYDRATE 25 ML: 25 INJECTION, SOLUTION INTRAVENOUS at 07:09

## 2018-03-01 RX ADMIN — INSULIN LISPRO 4 UNITS: 100 INJECTION, SOLUTION INTRAVENOUS; SUBCUTANEOUS at 20:37

## 2018-03-01 RX ADMIN — POTASSIUM CHLORIDE 20 MEQ: 149 INJECTION, SOLUTION, CONCENTRATE INTRAVENOUS at 01:00

## 2018-03-01 RX ADMIN — PANTOPRAZOLE SODIUM 40 MG: 40 INJECTION, POWDER, FOR SOLUTION INTRAVENOUS at 08:08

## 2018-03-01 RX ADMIN — INSULIN DETEMIR 15 UNITS: 100 INJECTION, SOLUTION SUBCUTANEOUS at 20:27

## 2018-03-01 RX ADMIN — POTASSIUM CHLORIDE 20 MEQ: 149 INJECTION, SOLUTION, CONCENTRATE INTRAVENOUS at 17:33

## 2018-03-01 RX ADMIN — POTASSIUM CHLORIDE 20 MEQ: 149 INJECTION, SOLUTION, CONCENTRATE INTRAVENOUS at 08:07

## 2018-03-01 RX ADMIN — POTASSIUM CHLORIDE 20 MEQ: 149 INJECTION, SOLUTION, CONCENTRATE INTRAVENOUS at 05:08

## 2018-03-01 RX ADMIN — POTASSIUM CHLORIDE 20 MEQ: 149 INJECTION, SOLUTION, CONCENTRATE INTRAVENOUS at 14:44

## 2018-03-01 RX ADMIN — MAGNESIUM SULFATE HEPTAHYDRATE 4 G: 40 INJECTION, SOLUTION INTRAVENOUS at 08:08

## 2018-03-01 RX ADMIN — TAZOBACTAM SODIUM AND PIPERACILLIN SODIUM 4.5 G: 500; 4 INJECTION, SOLUTION INTRAVENOUS at 20:27

## 2018-03-01 RX ADMIN — INSULIN LISPRO 4 UNITS: 100 INJECTION, SOLUTION INTRAVENOUS; SUBCUTANEOUS at 17:48

## 2018-03-01 NOTE — PROGRESS NOTES
Still in aflutter.    Ok to start heparin per Dr HASTINGS    Tenative KELLEY/ECV tomorrow or Monday depending on how the patient feels.    Khadra Mendiola MD, FACC

## 2018-03-01 NOTE — PROGRESS NOTES
Adult Nutrition  Assessment/PES    Patient Name:  Alex Talbert  YOB: 1955  MRN: [id number]  Admit Date:  2/24/2018    Assessment Date:  3/1/2018    Comments:  RD PO follow up. Pts diet advanced to full liquids today. Nutrition supplements adjusted. RD to continue to follow.             Reason for Assessment       03/01/18 0859    Reason for Assessment    Reason For Assessment/Visit multidisciplinary rounds;TF/PN;follow up protocol    Time Spent (min) 30    Diagnosis Diagnosis   Per notes this adm/MD diagnosis list noted   Principal Problem:    DKA  Active Problems:    Influenza B    Nausea & vomiting    Diarrhea    Diabetes mellitus    Typical atrial flutter    Perforated duodenal ulcer               Nutrition/Diet History       03/01/18 1055    Nutrition/Diet History    Reported/Observed By RN;MD    Other Pt states he ate approx 70% of breakfast tray, drinking supplements. Pt states no N/V, minimal abdominal pain.               Labs/Tests/Procedures/Meds       03/01/18 0901    Labs/Tests/Procedures/Meds    Labs/Tests Review Reviewed    Medication Review Reviewed, pertinent   GTT:D5+NaCl@80mL/hr Other meds: SSI, levemir, ABX, protonix     Results from last 7 days  Lab Units 03/01/18  0505  02/28/18  0444   SODIUM mmol/L 144  --  143   POTASSIUM mmol/L 3.1*  < > 3.5   CHLORIDE mmol/L 109  --  110*   CO2 mmol/L 26.0  --  24.0   BUN mg/dL 6*  --  7*   CREATININE mg/dL 0.60  --  0.60   CALCIUM mg/dL 8.2*  --  8.4*   BILIRUBIN mg/dL  --   --  1.0   ALK PHOS U/L  --   --  61   ALT (SGPT) U/L  --   --  25   AST (SGOT) U/L  --   --  23   GLUCOSE mg/dL 63*  --  148*   < > = values in this interval not displayed.      Results from last 7 days  Lab Units 03/01/18  0751 03/01/18  0706 02/28/18 2013 02/28/18  1618 02/28/18  1307 02/28/18  1101   GLUCOSE mg/dL 99 54* 206* 165* 201* 177*       Lab Results  Lab Value Date/Time   HGBA1C 12.40 (H) 02/26/2018 0400   HGBA1C 14.0 (H) 02/24/2018 1238         Intake &  Output (last day)       02/28 0701 - 03/01 0700 03/01 0701 - 03/02 0700    I.V. (mL/kg) 19.2 (0.2) 50.9 (0.6)    IV Piggyback 900 663.2    Total Intake(mL/kg) 919.2 (10.5) 714.1 (8.1)    Urine (mL/kg/hr) 1125 (0.5)     Other 225 (0.1) 50 (0.1)    Stool 0 (0)     Total Output 1350 50    Net -430.8 +664.1          Unmeasured Stool Occurrence 5 x                    Nutrition Prescription Ordered       03/01/18 0902    Nutrition Prescription PO    Current PO Diet Full Liquid    Supplement Boost Breeze    Supplement Frequency 3 times a day    Common Modifiers Cardiac;Consistent Carbohydrate            Evaluation of Received Nutrient/Fluid Intake       03/01/18 0902    PO Evaluation    Number of Days PO Intake Evaluated Insufficient Data            Problem/Interventions:        Problem 1       03/01/18 0903    Nutrition Diagnoses Problem 1    Problem 1 Predicted Suboptimal Intake    Etiology (related to) --   clinical status    Signs/Symptoms (evidenced by) --   Recent GI surgery                    Intervention Goal       03/01/18 0907    Intervention Goal    General Nutrition support treatment;Other (comment)   FSBG 140-180mg/dL    PO Establish PO            Nutrition Intervention       03/01/18 0908    Nutrition Intervention    RD/Tech Action Follow Tx progress;Care plan reviewd;Supplement provided;Recommend/ordered    Recommended/Ordered Supplement            Nutrition Prescription       03/01/18 1057    Nutrition Prescription PO    PO Prescription Begin/change supplement    Supplement Boost Glucose Control    Supplement Frequency 3 times a day    New PO Prescription Ordered? Yes            Education/Evaluation       03/01/18 1058    Monitor/Evaluation    Monitor Per protocol;I&O;Supplement intake;PO intake;Pertinent labs        Electronically signed by:  Carri Rutherford RDN, SARITA  03/01/18 10:58 AM

## 2018-03-01 NOTE — PROGRESS NOTES
"Alex Talbert  1955  [id number]    Surgery Progress Note    Date of visit: 3/1/2018    Subjective: Overall feels better  Tolerating clear liquids  Multiple bowel movements following an upper GI yesterday  Still in A. flutter    Objective:    /100 (BP Location: Right arm, Patient Position: Sitting)  Pulse 97  Temp 97.7 °F (36.5 °C) (Axillary)   Resp 20  Ht 182.9 cm (72\")  Wt 88 kg (193 lb 14.4 oz)  SpO2 92%  BMI 26.3 kg/m2    Intake/Output Summary (Last 24 hours) at 03/01/18 0847  Last data filed at 03/01/18 0800   Gross per 24 hour   Intake            719.2 ml   Output             1350 ml   Net           -630.8 ml         L: normal air entry    Abd: Soft with active bowel sounds  Wound is clean and packed  Anam-Hidalgo drainage was very minimal serousanguinous drainage      LABS:      Results from last 7 days  Lab Units 03/01/18  0505   WBC 10*3/mm3 9.68   HEMOGLOBIN g/dL 13.4   HEMATOCRIT % 39.3   PLATELETS 10*3/mm3 326       Results from last 7 days  Lab Units 03/01/18  0505  02/28/18  0444   SODIUM mmol/L 144  --  143   POTASSIUM mmol/L 3.1*  < > 3.5   CHLORIDE mmol/L 109  --  110*   CO2 mmol/L 26.0  --  24.0   BUN mg/dL 6*  --  7*   CREATININE mg/dL 0.60  --  0.60   CALCIUM mg/dL 8.2*  --  8.4*   BILIRUBIN mg/dL  --   --  1.0   ALK PHOS U/L  --   --  61   ALT (SGPT) U/L  --   --  25   AST (SGOT) U/L  --   --  23   GLUCOSE mg/dL 63*  --  148*   < > = values in this interval not displayed.    Results from last 7 days  Lab Units 03/01/18  0505   SODIUM mmol/L 144   POTASSIUM mmol/L 3.1*   CHLORIDE mmol/L 109   CO2 mmol/L 26.0   BUN mg/dL 6*   CREATININE mg/dL 0.60   GLUCOSE mg/dL 63*   CALCIUM mg/dL 8.2*       Lab Results  Lab Value Date/Time   LIPASE 38 02/24/2018 1238         Assessment/ Plan: Overall stable course status post repair of perforated duodenal ulcer as well as a cholecystectomy  Plan to advance the diet to full liquids  Patient is awaiting cardioversion by cardiology  Okay to " start heparin    Problem List Items Addressed This Visit     Influenza B - Primary    Typical atrial flutter    Relevant Medications    digoxin (LANOXIN) injection 500 mcg (Completed)      Other Visit Diagnoses     Diabetic ketoacidosis without coma associated with other specified diabetes mellitus        Acute renal failure, unspecified acute renal failure type        Metabolic acidosis        Cholecystitis        Relevant Orders    Tissue Pathology Exam (Completed)            Radha Siu MD  3/1/2018  8:47 AM

## 2018-03-01 NOTE — PLAN OF CARE
Problem: Patient Care Overview (Adult)  Goal: Plan of Care Review  Outcome: Ongoing (interventions implemented as appropriate)   03/01/18 1619   Coping/Psychosocial Response Interventions   Plan Of Care Reviewed With patient   Patient Care Overview   Progress improving   Outcome Evaluation   Outcome Summary/Follow up Plan OOB to chair. Pain acceptable. Replacing K+ and Mg++. Declined ambulation due to now having show with orthotic implant. Advanced form clear to full liquids. Hep gtt for A fib/flutter. Planning KELLEY cardiversion on 3/2 at 1030 with [facility].        Problem: Sepsis (Adult)  Goal: Signs and Symptoms of Listed Potential Problems Will be Absent or Manageable (Sepsis)  Outcome: Ongoing (interventions implemented as appropriate)   03/01/18 1619   Sepsis   Problems Assessed (Sepsis) all   Problems Present (Sepsis) none       Problem: Pressure Ulcer Risk (Donis Scale) (Adult,Obstetrics,Pediatric)  Goal: Skin Integrity  Outcome: Ongoing (interventions implemented as appropriate)   03/01/18 1619   Pressure Ulcer Risk (Donis Scale) (Adult,Obstetrics,Pediatric)   Skin Integrity making progress toward outcome

## 2018-03-01 NOTE — PROGRESS NOTES
INTENSIVIST   PROGRESS NOTE     Hospital:  LOS: 5 days   Subjective   Mr. Alex Talbert, 63 y.o. male is followed for:      DKA    Influenza B    Typical atrial flutter    Perforated duodenal ulcer      As an Intensivist, we provide an integrated approach to the ICU patient and family, medical management of comorbid conditions, lead interdisciplinary rounds and coordinate the care with all other services, including those from other specialists.     S     Interval History:  POD: 4 Days Post-Op    Better.  Some low glucose yesterday and early this morning, but asymptomatic.  Today his diet will be advanced.     The patient's relevant past medical, surgical and social history were reviewed and updated in Epic as appropriate.      ROS:   Constitutional: Negative for fever.   Respiratory: Negative for dyspnea.   Cardiovascular: Negative for chest pain.   Gastrointestinal: Negative for  nausea, vomiting and diarrhea.     Objective   O     Vitals:  Temp: 98.9 °F (37.2 °C) (03/01/18 1600) Temp  Min: 97.7 °F (36.5 °C)  Max: 99.3 °F (37.4 °C)   BP: 134/91 (03/01/18 1600) BP  Min: 112/82  Max: 153/100   Pulse: 91 (03/01/18 1600) Pulse  Min: 77  Max: 134   Resp: 20 (03/01/18 1600) Resp  Min: 16  Max: 20   SpO2: 95 % (03/01/18 1600) SpO2  Min: 76 %  Max: 95 %   Device: room air (03/01/18 1600)    Flow Rate: 2 (02/28/18 1400) No Data Recorded     Intake/Ouptut 24 hrs (7:00AM - 6:59 AM)  Intake & Output (last 3 days)       02/26 0701 - 02/27 0700 02/27 0701 - 02/28 0700 02/28 0701 - 03/01 0700 03/01 0701 - 03/02 0700    P.O.        I.V. (mL/kg) 1597.6 (17.1) 1717.1 (19.5) 19.2 (0.2) 50.9 (0.6)    IV Piggyback 1062.8 388.9 900 663.2    Total Intake(mL/kg) 2660.4 (28.5) 2106 (23.9) 919.2 (10.5) 714.1 (8.1)    Urine (mL/kg/hr) 4305 (1.9) 1470 (0.7) 1125 (0.5) 0 (0)    Other 460 (0.2) 298 (0.1) 225 (0.1) 125 (0.2)    Stool   0 (0) 0 (0)    Total Output 4765 1768 1350 125    Net -2104.6 +338 -430.8 +589.1            Unmeasured Urine  Occurrence    1 x    Unmeasured Stool Occurrence   5 x 1 x          Medications (drips):    dextrose 5 % and sodium chloride 0.45 % Last Rate: 80 mL/hr (02/27/18 2210)   heparin (porcine) Last Rate: 11 Units/kg/hr (03/01/18 1111)   Pharmacy to Dose Heparin      Physical Examination    Telemetry:    .Sinus Rhythm: sinus tachycardia (02/25/18 1200)  Atrial Rhythm: atrial flutter (03/01/18 1600)      Constitutional:  No acute distress.   Cardiovascular: Normal rate, regular and rhythm. Normal heart sounds.  No murmurs, gallop or rub.   Respiratory: No respiratory distress. Normal respiratory effort.  Normal breath sounds  Clear to auscultation and percussion.    Abdominal:  Soft. No masses.  No distension. No HSM.  Post op.   Extremities: No digital cyanosis. No clubbing.  No peripheral edema.   Neurological:   Alert and Oriented to person, place, and time.  Best Eye Response: 4-->(E4) spontaneous (03/01/18 1600)  Best Motor Response: 6-->(M6) obeys commands (03/01/18 1600)  Best Verbal Response: 5-->(V5) oriented (03/01/18 1600)  Dave Coma Scale Score: 15 (03/01/18 1600)   Lines/Drains/Airways: Peripheral IV(s)  NG       Hematology:    Results from last 7 days  Lab Units 03/01/18  0505 02/28/18  0444 02/27/18  0353   WBC 10*3/mm3 9.68 10.88* 10.91*   HEMOGLOBIN g/dL 13.4 13.5 14.3   MCV fL 84.0 83.8 85.0   PLATELETS 10*3/mm3 326 230 194     Electrolytes, Magnesium and Phosphorus:    Results from last 7 days  Lab Units 03/01/18  0505 02/28/18  2318 02/28/18  0444 02/27/18  0353 02/26/18  0401   SODIUM mmol/L 144  --  143 142 136   POTASSIUM mmol/L 3.1* 3.0* 3.5 4.3 3.9   CO2 mmol/L 26.0  --  24.0 19.0* 15.0*   MAGNESIUM mg/dL 1.8  --  1.8 2.5 1.9   PHOSPHORUS mg/dL  --   --  2.6 2.8 1.6*     Renal:    Results from last 7 days  Lab Units 03/01/18  0505 02/28/18  0444 02/27/18  0353   CREATININE mg/dL 0.60 0.60 0.60   BUN mg/dL 6* 7* 7*     Estimated Creatinine Clearance: 160.9 mL/min (by C-G formula based on Cr of  0.6).     Images:    Fl Upper Gi Single Contrast Sbft    Result Date: 2/28/2018  Upper GI series: Status post duodenal ulcer repair. There is mild mucosal irregularity, and luminal narrowing of the distal duodenal bulb, and the first portion of the duodenum, which likely represents postoperative changes, and/or edema. There was no evidence of extraluminal contrast. There was no delay in gastric emptying.  Small bowel follow-through: Small bowel series appeared within normal limits.    This report was finalized on 2/28/2018 4:57 PM by Dr. Chip Brownlee MD.      Results: Reviewed.  I reviewed the patient's new laboratory and imaging results.  I independently reviewed the patient's new images.    Medications: Reviewed.    Assessment/Plan   A / P     63 y.o.male, admitted on 2/24/2018 with Influenza B [J10.1]:     1. Perforated Duodenal ulcer.  Procedure(s) (LRB):  LAPAROTOMY EXPLORATORY (N/A), EMERGENT, REPAIR OF PERFORATED DUODENAL ULCER and CHOLECYSTECTOMY  Dr. Rosalee Vizcaino  2/25/2018  2. Influenza B (2/24/2018), with respiratory symptoms for 5 days prior to admission.  3. A Flutter } [facility]  4. Antibiotics: Piperacillin-Tazobactam +Fluconazole  5. T2 DM, DKA on admission, improved.    Results from last 7 days  Lab Units 03/01/18  1137 03/01/18  0751 03/01/18  0706 02/28/18 2013 02/28/18  1618 02/28/18  1307   GLUCOSE mg/dL 223* 99 54* 206* 165* 201*       Lab Results  Lab Value Date/Time   HGBA1C 12.40 (H) 02/26/2018 0400   HGBA1C 14.0 (H) 02/24/2018 1238       Nutrition Support:   Active Supplement Orders      Dietary Nutrition Supplements Boost Glucose Control   Diet:  Diet Full Liquid; Consistent Carbohydrate, Cardiac  NPO Diet   Advance Directives: Full Code     Assessment / Plan:    1. Discussed with Dr. HASTINGS  2. Anticoagulation with Heparin gtt  3. Probably cardioversion tomorrow, thus we will keep him in the ICU  4. Replace K  5. Disposition: Keep in ICU.    Plan of care and goals reviewed during  interdisciplinary rounds.  I discussed the patient's findings and my recommendations with patient, nursing staff and consulting provider    Edy Weber MD, FACP, FCCP, CNSC  Intensive Care Medicine, Nutrition Support and Pulmonary Medicine

## 2018-03-01 NOTE — PLAN OF CARE
Problem: Patient Care Overview (Adult)  Goal: Plan of Care Review  Outcome: Ongoing (interventions implemented as appropriate)   03/01/18 0551   Coping/Psychosocial Response Interventions   Plan Of Care Reviewed With patient   Patient Care Overview   Progress improving   Outcome Evaluation   Outcome Summary/Follow up Plan MARY KATE TOM was 3 after replacement, currently replacing. VSS.        Problem: Sepsis (Adult)  Goal: Signs and Symptoms of Listed Potential Problems Will be Absent or Manageable (Sepsis)  Outcome: Ongoing (interventions implemented as appropriate)      Problem: Pressure Ulcer Risk (Donis Scale) (Adult,Obstetrics,Pediatric)  Goal: Skin Integrity  Outcome: Ongoing (interventions implemented as appropriate)

## 2018-03-02 ENCOUNTER — APPOINTMENT (OUTPATIENT)
Dept: CARDIOLOGY | Facility: HOSPITAL | Age: 63
End: 2018-03-02
Attending: INTERNAL MEDICINE

## 2018-03-02 LAB
ANION GAP SERPL CALCULATED.3IONS-SCNC: 6 MMOL/L (ref 3–11)
APTT PPP: 38.9 SECONDS (ref 55–70)
APTT PPP: 49.9 SECONDS (ref 55–70)
BACTERIA SPEC AEROBE CULT: NORMAL
BACTERIA SPEC AEROBE CULT: NORMAL
BASOPHILS # BLD AUTO: 0.03 10*3/MM3 (ref 0–0.2)
BASOPHILS NFR BLD AUTO: 0.4 % (ref 0–1)
BH CV VAS BP LEFT ARM: NORMAL MMHG
BUN BLD-MCNC: <5 MG/DL (ref 9–23)
BUN/CREAT SERPL: ABNORMAL (ref 7–25)
CALCIUM SPEC-SCNC: 8.2 MG/DL (ref 8.7–10.4)
CHLORIDE SERPL-SCNC: 111 MMOL/L (ref 99–109)
CO2 SERPL-SCNC: 27 MMOL/L (ref 20–31)
CREAT BLD-MCNC: 0.6 MG/DL (ref 0.6–1.3)
DEPRECATED RDW RBC AUTO: 39.5 FL (ref 37–54)
EOSINOPHIL # BLD AUTO: 0.42 10*3/MM3 (ref 0–0.3)
EOSINOPHIL NFR BLD AUTO: 5.3 % (ref 0–3)
ERYTHROCYTE [DISTWIDTH] IN BLOOD BY AUTOMATED COUNT: 13.1 % (ref 11.3–14.5)
GFR SERPL CREATININE-BSD FRML MDRD: 137 ML/MIN/1.73
GLUCOSE BLD-MCNC: 143 MG/DL (ref 70–100)
GLUCOSE BLDC GLUCOMTR-MCNC: 104 MG/DL (ref 70–130)
GLUCOSE BLDC GLUCOMTR-MCNC: 190 MG/DL (ref 70–130)
GLUCOSE BLDC GLUCOMTR-MCNC: 240 MG/DL (ref 70–130)
GLUCOSE BLDC GLUCOMTR-MCNC: 91 MG/DL (ref 70–130)
HCT VFR BLD AUTO: 39.5 % (ref 38.9–50.9)
HGB BLD-MCNC: 13.2 G/DL (ref 13.1–17.5)
IMM GRANULOCYTES # BLD: 0.16 10*3/MM3 (ref 0–0.03)
IMM GRANULOCYTES NFR BLD: 2 % (ref 0–0.6)
LV EF 2D ECHO EST: 55 %
LYMPHOCYTES # BLD AUTO: 1.81 10*3/MM3 (ref 0.6–4.8)
LYMPHOCYTES NFR BLD AUTO: 22.7 % (ref 24–44)
MAGNESIUM SERPL-MCNC: 1.9 MG/DL (ref 1.3–2.7)
MCH RBC QN AUTO: 28.2 PG (ref 27–31)
MCHC RBC AUTO-ENTMCNC: 33.4 G/DL (ref 32–36)
MCV RBC AUTO: 84.4 FL (ref 80–99)
MONOCYTES # BLD AUTO: 1.06 10*3/MM3 (ref 0–1)
MONOCYTES NFR BLD AUTO: 13.3 % (ref 0–12)
NEUTROPHILS # BLD AUTO: 4.5 10*3/MM3 (ref 1.5–8.3)
NEUTROPHILS NFR BLD AUTO: 56.3 % (ref 41–71)
PHOSPHATE SERPL-MCNC: 3.6 MG/DL (ref 2.4–5.1)
PLATELET # BLD AUTO: 335 10*3/MM3 (ref 150–450)
PMV BLD AUTO: 9.7 FL (ref 6–12)
POTASSIUM BLD-SCNC: 3.3 MMOL/L (ref 3.5–5.5)
POTASSIUM BLD-SCNC: 3.3 MMOL/L (ref 3.5–5.5)
POTASSIUM BLD-SCNC: 3.9 MMOL/L (ref 3.5–5.5)
RBC # BLD AUTO: 4.68 10*6/MM3 (ref 4.2–5.76)
SODIUM BLD-SCNC: 144 MMOL/L (ref 132–146)
WBC NRBC COR # BLD: 7.98 10*3/MM3 (ref 3.5–10.8)

## 2018-03-02 PROCEDURE — 84132 ASSAY OF SERUM POTASSIUM: CPT | Performed by: INTERNAL MEDICINE

## 2018-03-02 PROCEDURE — 93320 DOPPLER ECHO COMPLETE: CPT

## 2018-03-02 PROCEDURE — 63710000001 INSULIN DETEMIR PER 5 UNITS

## 2018-03-02 PROCEDURE — 92960 CARDIOVERSION ELECTRIC EXT: CPT | Performed by: INTERNAL MEDICINE

## 2018-03-02 PROCEDURE — 99233 SBSQ HOSP IP/OBS HIGH 50: CPT | Performed by: INTERNAL MEDICINE

## 2018-03-02 PROCEDURE — 93320 DOPPLER ECHO COMPLETE: CPT | Performed by: INTERNAL MEDICINE

## 2018-03-02 PROCEDURE — 93325 DOPPLER ECHO COLOR FLOW MAPG: CPT | Performed by: INTERNAL MEDICINE

## 2018-03-02 PROCEDURE — 63710000001 INSULIN LISPRO (HUMAN) PER 5 UNITS: Performed by: INTERNAL MEDICINE

## 2018-03-02 PROCEDURE — 85730 THROMBOPLASTIN TIME PARTIAL: CPT

## 2018-03-02 PROCEDURE — 93312 ECHO TRANSESOPHAGEAL: CPT

## 2018-03-02 PROCEDURE — 5A2204Z RESTORATION OF CARDIAC RHYTHM, SINGLE: ICD-10-PCS | Performed by: INTERNAL MEDICINE

## 2018-03-02 PROCEDURE — 25010000002 FLUCONAZOLE PER 200 MG: Performed by: SURGERY

## 2018-03-02 PROCEDURE — 25010000002 HEPARIN (PORCINE) PER 1000 UNITS

## 2018-03-02 PROCEDURE — 83735 ASSAY OF MAGNESIUM: CPT | Performed by: INTERNAL MEDICINE

## 2018-03-02 PROCEDURE — 25010000002 MIDAZOLAM PER 1 MG: Performed by: INTERNAL MEDICINE

## 2018-03-02 PROCEDURE — 25010000002 FENTANYL CITRATE (PF) 100 MCG/2ML SOLUTION: Performed by: INTERNAL MEDICINE

## 2018-03-02 PROCEDURE — 80048 BASIC METABOLIC PNL TOTAL CA: CPT | Performed by: INTERNAL MEDICINE

## 2018-03-02 PROCEDURE — 63710000001 INSULIN DETEMIR PER 5 UNITS: Performed by: INTERNAL MEDICINE

## 2018-03-02 PROCEDURE — 97162 PT EVAL MOD COMPLEX 30 MIN: CPT

## 2018-03-02 PROCEDURE — 85025 COMPLETE CBC W/AUTO DIFF WBC: CPT | Performed by: INTERNAL MEDICINE

## 2018-03-02 PROCEDURE — B24BZZ4 ULTRASONOGRAPHY OF HEART WITH AORTA, TRANSESOPHAGEAL: ICD-10-PCS | Performed by: INTERNAL MEDICINE

## 2018-03-02 PROCEDURE — 82962 GLUCOSE BLOOD TEST: CPT

## 2018-03-02 PROCEDURE — 94799 UNLISTED PULMONARY SVC/PX: CPT

## 2018-03-02 PROCEDURE — 93312 ECHO TRANSESOPHAGEAL: CPT | Performed by: INTERNAL MEDICINE

## 2018-03-02 PROCEDURE — 93325 DOPPLER ECHO COLOR FLOW MAPG: CPT

## 2018-03-02 PROCEDURE — 25010000002 PIPERACILLIN SOD-TAZOBACTAM PER 1 G: Performed by: INTERNAL MEDICINE

## 2018-03-02 PROCEDURE — 84100 ASSAY OF PHOSPHORUS: CPT | Performed by: INTERNAL MEDICINE

## 2018-03-02 RX ORDER — FENTANYL CITRATE 50 UG/ML
INJECTION, SOLUTION INTRAMUSCULAR; INTRAVENOUS
Status: DISPENSED
Start: 2018-03-02 | End: 2018-03-02

## 2018-03-02 RX ORDER — FENTANYL CITRATE 50 UG/ML
INJECTION, SOLUTION INTRAMUSCULAR; INTRAVENOUS
Status: COMPLETED | OUTPATIENT
Start: 2018-03-02 | End: 2018-03-02

## 2018-03-02 RX ORDER — MIDAZOLAM HYDROCHLORIDE 1 MG/ML
INJECTION INTRAMUSCULAR; INTRAVENOUS
Status: DISPENSED
Start: 2018-03-02 | End: 2018-03-02

## 2018-03-02 RX ORDER — HEPARIN SODIUM 1000 [USP'U]/ML
5000 INJECTION, SOLUTION INTRAVENOUS; SUBCUTANEOUS ONCE
Status: COMPLETED | OUTPATIENT
Start: 2018-03-02 | End: 2018-03-02

## 2018-03-02 RX ORDER — DEXTROSE, SODIUM CHLORIDE, SODIUM LACTATE, POTASSIUM CHLORIDE, AND CALCIUM CHLORIDE 5; .6; .31; .03; .02 G/100ML; G/100ML; G/100ML; G/100ML; G/100ML
50 INJECTION, SOLUTION INTRAVENOUS CONTINUOUS
Status: DISCONTINUED | OUTPATIENT
Start: 2018-03-02 | End: 2018-03-02

## 2018-03-02 RX ORDER — PANTOPRAZOLE SODIUM 40 MG/1
40 TABLET, DELAYED RELEASE ORAL
Status: DISCONTINUED | OUTPATIENT
Start: 2018-03-02 | End: 2018-03-05 | Stop reason: HOSPADM

## 2018-03-02 RX ORDER — POTASSIUM CHLORIDE 1.5 G/1.77G
40 POWDER, FOR SOLUTION ORAL AS NEEDED
Status: DISCONTINUED | OUTPATIENT
Start: 2018-03-02 | End: 2018-03-02

## 2018-03-02 RX ORDER — MIDAZOLAM HYDROCHLORIDE 5 MG/ML
INJECTION INTRAMUSCULAR; INTRAVENOUS
Status: COMPLETED | OUTPATIENT
Start: 2018-03-02 | End: 2018-03-02

## 2018-03-02 RX ORDER — POTASSIUM CHLORIDE 750 MG/1
40 CAPSULE, EXTENDED RELEASE ORAL AS NEEDED
Status: DISCONTINUED | OUTPATIENT
Start: 2018-03-02 | End: 2018-03-05 | Stop reason: HOSPADM

## 2018-03-02 RX ADMIN — FLUCONAZOLE 200 MG: 2 INJECTION INTRAVENOUS at 08:22

## 2018-03-02 RX ADMIN — FENTANYL CITRATE 50 MCG: 50 INJECTION, SOLUTION INTRAMUSCULAR; INTRAVENOUS at 11:05

## 2018-03-02 RX ADMIN — METHOHEXITAL SODIUM 20 MG: 500 INJECTION, POWDER, LYOPHILIZED, FOR SOLUTION INTRAMUSCULAR; INTRAVENOUS; RECTAL at 10:56

## 2018-03-02 RX ADMIN — FENTANYL CITRATE 50 MCG: 50 INJECTION, SOLUTION INTRAMUSCULAR; INTRAVENOUS at 10:45

## 2018-03-02 RX ADMIN — INSULIN LISPRO 4 UNITS: 100 INJECTION, SOLUTION INTRAVENOUS; SUBCUTANEOUS at 22:14

## 2018-03-02 RX ADMIN — SODIUM CHLORIDE, SODIUM LACTATE, POTASSIUM CHLORIDE, CALCIUM CHLORIDE AND DEXTROSE MONOHYDRATE 50 ML/HR: 5; 600; 310; 30; 20 INJECTION, SOLUTION INTRAVENOUS at 08:35

## 2018-03-02 RX ADMIN — MAGNESIUM SULFATE HEPTAHYDRATE 4 G: 40 INJECTION, SOLUTION INTRAVENOUS at 05:33

## 2018-03-02 RX ADMIN — POTASSIUM CHLORIDE 40 MEQ: 750 CAPSULE, EXTENDED RELEASE ORAL at 14:05

## 2018-03-02 RX ADMIN — METHOHEXITAL SODIUM 20 MG: 500 INJECTION, POWDER, LYOPHILIZED, FOR SOLUTION INTRAMUSCULAR; INTRAVENOUS; RECTAL at 10:52

## 2018-03-02 RX ADMIN — METHOHEXITAL SODIUM 10 MG: 500 INJECTION, POWDER, LYOPHILIZED, FOR SOLUTION INTRAMUSCULAR; INTRAVENOUS; RECTAL at 11:06

## 2018-03-02 RX ADMIN — MIDAZOLAM HYDROCHLORIDE 2 MG: 5 INJECTION, SOLUTION INTRAMUSCULAR; INTRAVENOUS at 10:45

## 2018-03-02 RX ADMIN — TAZOBACTAM SODIUM AND PIPERACILLIN SODIUM 4.5 G: 500; 4 INJECTION, SOLUTION INTRAVENOUS at 22:14

## 2018-03-02 RX ADMIN — HEPARIN SODIUM 19 UNITS/KG/HR: 10000 INJECTION, SOLUTION INTRAVENOUS at 05:33

## 2018-03-02 RX ADMIN — METOPROLOL TARTRATE 5 MG: 5 INJECTION, SOLUTION INTRAVENOUS at 11:04

## 2018-03-02 RX ADMIN — INSULIN LISPRO 2 UNITS: 100 INJECTION, SOLUTION INTRAVENOUS; SUBCUTANEOUS at 17:20

## 2018-03-02 RX ADMIN — HEPARIN SODIUM 5000 UNITS: 1000 INJECTION, SOLUTION INTRAVENOUS; SUBCUTANEOUS at 04:33

## 2018-03-02 RX ADMIN — PANTOPRAZOLE SODIUM 40 MG: 40 TABLET, DELAYED RELEASE ORAL at 18:00

## 2018-03-02 RX ADMIN — MIDAZOLAM HYDROCHLORIDE 2 MG: 5 INJECTION, SOLUTION INTRAMUSCULAR; INTRAVENOUS at 10:51

## 2018-03-02 RX ADMIN — INSULIN DETEMIR 15 UNITS: 100 INJECTION, SOLUTION SUBCUTANEOUS at 21:57

## 2018-03-02 RX ADMIN — APIXABAN 5 MG: 5 TABLET, FILM COATED ORAL at 14:40

## 2018-03-02 RX ADMIN — FENTANYL CITRATE 50 MCG: 50 INJECTION, SOLUTION INTRAMUSCULAR; INTRAVENOUS at 10:51

## 2018-03-02 RX ADMIN — TAZOBACTAM SODIUM AND PIPERACILLIN SODIUM 4.5 G: 500; 4 INJECTION, SOLUTION INTRAVENOUS at 04:07

## 2018-03-02 RX ADMIN — INSULIN DETEMIR 8 UNITS: 100 INJECTION, SOLUTION SUBCUTANEOUS at 09:35

## 2018-03-02 RX ADMIN — TAZOBACTAM SODIUM AND PIPERACILLIN SODIUM 4.5 G: 500; 4 INJECTION, SOLUTION INTRAVENOUS at 14:05

## 2018-03-02 RX ADMIN — APIXABAN 5 MG: 5 TABLET, FILM COATED ORAL at 21:57

## 2018-03-02 RX ADMIN — MIDAZOLAM HYDROCHLORIDE 2 MG: 5 INJECTION, SOLUTION INTRAMUSCULAR; INTRAVENOUS at 11:05

## 2018-03-02 RX ADMIN — METHOHEXITAL SODIUM 20 MG: 500 INJECTION, POWDER, LYOPHILIZED, FOR SOLUTION INTRAMUSCULAR; INTRAVENOUS; RECTAL at 11:10

## 2018-03-02 RX ADMIN — PANTOPRAZOLE SODIUM 40 MG: 40 INJECTION, POWDER, FOR SOLUTION INTRAVENOUS at 08:22

## 2018-03-02 RX ADMIN — POTASSIUM CHLORIDE 40 MEQ: 750 CAPSULE, EXTENDED RELEASE ORAL at 05:33

## 2018-03-02 NOTE — PROGRESS NOTES
Multidisciplinary Rounds    Time: 20min  Patient Name: Alex Talbert  Date of Encounter: 03/02/18 9:39 AM  MRN: [id number]  Admission date: 2/24/2018      Reason for visit: MDR. RD to continue to follow per protocol.     Additional information obtained during MDR: Pt NPO for KELLEY and cardioversion.     Current diet: NPO Diet    Active Supplement Orders      Dietary Nutrition Supplements Boost Glucose Control    Intervention:  Follow treatment plan  Care plan reviewed    Follow up:   Per protocol      Carri Rutherford RDN, LD  9:39 AM

## 2018-03-02 NOTE — PROCEDURES
Electrical Cardioversion  Date/Time: 3/2/2018 11:16 AM  Performed by: KHADRA RAMEY  Authorized by: KHADRA RAMEY   Consent: Verbal consent obtained.  Consent given by: patient  Patient understanding: patient states understanding of the procedure being performed  Patient consent: the patient's understanding of the procedure matches consent given  Patient identity confirmed: verbally with patient    Sedation:  Patient sedated: yes  Cardioversion basis: elective  Pre-procedure rhythm: atrial flutter  Patient position: patient was placed in a supine position  Chest area: chest area exposed  Electrodes: pads  Electrodes placed: anterior-posterior  Number of attempts: 1  Attempt 1 mode: synchronous  Attempt 1 waveform: biphasic  Attempt 1 shock (in Joules): 200  Attempt 1 outcome: conversion to normal sinus rhythm  Post-procedure rhythm: normal sinus rhythm        The patient was sedated using 4 mg of Versed, 100 µg of fentanyl and 70 mg of Brevital.  Following a transesophageal echo which showed no evidence of left atrial appendage thrombus the patient underwent cardioversion in a synchronized fashion with the biphasic defibrillator at 200 J.  Prompt conversion to sinus rhythm occurred.  There were no complications.    Khadra Ramey M.D. [facility]

## 2018-03-02 NOTE — PROGRESS NOTES
"Alex Talbert  1955  [id number]    Surgery Progress Note    Date of visit: 3/2/2018    Subjective: Patient underwent cardioversion and is sinus rhythm at this time  Overall he feels well  He tolerated liquid diet yesterday    Objective:    /89  Pulse 87  Temp 98.4 °F (36.9 °C) (Axillary)   Resp 18  Ht 182.9 cm (72\")  Wt 85.8 kg (189 lb 3.2 oz)  SpO2 96%  BMI 25.66 kg/m2    Intake/Output Summary (Last 24 hours) at 03/02/18 1432  Last data filed at 03/02/18 0600   Gross per 24 hour   Intake           1222.9 ml   Output             2575 ml   Net          -1352.1 ml       CV: Regular rate and rhythm  L: normal air entry    Abd: Soft with clean wound   Anam-Hidalgo drain with serous drainage        LABS:      Results from last 7 days  Lab Units 03/02/18  0319   WBC 10*3/mm3 7.98   HEMOGLOBIN g/dL 13.2   HEMATOCRIT % 39.5   PLATELETS 10*3/mm3 335       Results from last 7 days  Lab Units 03/02/18  0320  02/28/18  0444   SODIUM mmol/L 144  < > 143   POTASSIUM mmol/L 3.3*  3.3*  < > 3.5   CHLORIDE mmol/L 111*  < > 110*   CO2 mmol/L 27.0  < > 24.0   BUN mg/dL <5*  < > 7*   CREATININE mg/dL 0.60  < > 0.60   CALCIUM mg/dL 8.2*  < > 8.4*   BILIRUBIN mg/dL  --   --  1.0   ALK PHOS U/L  --   --  61   ALT (SGPT) U/L  --   --  25   AST (SGOT) U/L  --   --  23   GLUCOSE mg/dL 143*  < > 148*   < > = values in this interval not displayed.    Results from last 7 days  Lab Units 03/02/18  0320   SODIUM mmol/L 144   POTASSIUM mmol/L 3.3*  3.3*   CHLORIDE mmol/L 111*   CO2 mmol/L 27.0   BUN mg/dL <5*   CREATININE mg/dL 0.60   GLUCOSE mg/dL 143*   CALCIUM mg/dL 8.2*       Lab Results  Lab Value Date/Time   LIPASE 38 02/24/2018 1238         Assessment/ Plan: Overall stable course   Plan to advance the diet today   Hopefully remove the Anam-Hidalgo tomorrow and stop the antibiotics   May transfer to telemetry     Problem List Items Addressed This Visit     Influenza B - Primary    Typical atrial flutter    Relevant " Medications    digoxin (LANOXIN) injection 500 mcg (Completed)    apixaban (ELIQUIS) tablet 5 mg (Start on 3/2/2018  2:45 PM)    Other Relevant Orders    Electrical Cardioversion (Completed)      Other Visit Diagnoses     Diabetic ketoacidosis without coma associated with other specified diabetes mellitus        Acute renal failure, unspecified acute renal failure type        Metabolic acidosis        Cholecystitis        Relevant Orders    Tissue Pathology Exam (Completed)            Radha Siu MD  3/2/2018  2:32 PM

## 2018-03-02 NOTE — PLAN OF CARE
Problem: Patient Care Overview (Adult)  Goal: Plan of Care Review  Outcome: Ongoing (interventions implemented as appropriate)   03/02/18 0516   Coping/Psychosocial Response Interventions   Plan Of Care Reviewed With patient   Patient Care Overview   Progress improving   Outcome Evaluation   Outcome Summary/Follow up Plan VSS. NPO since midnight for KELLEY cardioversion this AM. K 3.3 and Mg 1.9 this AM, will replace.        Problem: Sepsis (Adult)  Goal: Signs and Symptoms of Listed Potential Problems Will be Absent or Manageable (Sepsis)  Outcome: Ongoing (interventions implemented as appropriate)      Problem: Pressure Ulcer Risk (Donis Scale) (Adult,Obstetrics,Pediatric)  Goal: Skin Integrity  Outcome: Ongoing (interventions implemented as appropriate)

## 2018-03-02 NOTE — PROGRESS NOTES
"[facility] Daily Note       LOS: 6 days   Patient Care Team:  Dayo Richardson MD as PCP - General (Family Medicine)    Chief Complaint:  Atrial flutter    Subjective     Subjective: No complaints.  92% RA.  Less sore. Tolerating heparin.    Review of Systems:   As above.    Medications:    fluconazole 200 mg Intravenous Daily   insulin detemir 15 Units Subcutaneous Nightly   insulin lispro 0-9 Units Subcutaneous 4x Daily With Meals & Nightly   pantoprazole 40 mg Intravenous Q12H   piperacillin-tazobactam 4.5 g Intravenous Q8H       Objective     Vital Sign Min/Max for last 24 hours  Temp  Min: 97.7 °F (36.5 °C)  Max: 99.3 °F (37.4 °C)   BP  Min: 113/79  Max: 169/97   Pulse  Min: 86  Max: 97   Resp  Min: 18  Max: 20   SpO2  Min: 90 %  Max: 97 %   No Data Recorded   Weight  Min: 85.8 kg (189 lb 3.2 oz)  Max: 85.8 kg (189 lb 3.2 oz)      Intake/Output Summary (Last 24 hours) at 03/02/18 0735  Last data filed at 03/02/18 0600   Gross per 24 hour   Intake             1937 ml   Output             2625 ml   Net             -688 ml        Flowsheet Rows         First Filed Value    Admission Height  182.9 cm (72\") Documented at 02/24/2018 1213    Admission Weight  88.5 kg (195 lb) Documented at 02/24/2018 1213          Physical Exam:    General: Alert and oriented.   Cardiovascular: Heart has a nondisplaced focal PMI. Irregular rate and rhythm without murmur, gallop or rub.  Lungs: Clear without rales or wheezes. Equal expansion is noted.  Slightly decreased bases  Abdomen: Soft, tender.  Extremities: Show no edema.   Skin: warm and dry.     Results Review:    I reviewed the patient's new clinical results.  EKG:  Tele: Typical flutter    Labs:      Results from last 7 days  Lab Units 03/02/18  0320 03/01/18  0505 02/28/18  2318 02/28/18  0444  02/26/18  0401  02/25/18  0331   SODIUM mmol/L 144 144  --  143  < > 136  < > 135   POTASSIUM mmol/L 3.3*  3.3* 3.1* 3.0* 3.5  < > 3.9  < > 4.1   CHLORIDE mmol/L 111* " 109  --  110*  < > 115*  < > 113*   CO2 mmol/L 27.0 26.0  --  24.0  < > 15.0*  < > 13.0*   BUN mg/dL <5* 6*  --  7*  < > 8*  < > 22   CREATININE mg/dL 0.60 0.60  --  0.60  < > 0.60  < > 1.00   CALCIUM mg/dL 8.2* 8.2*  --  8.4*  < > 8.2*  < > 8.4*   BILIRUBIN mg/dL  --   --   --  1.0  --  0.5  --  0.3   ALK PHOS U/L  --   --   --  61  --  52  --  74   ALT (SGPT) U/L  --   --   --  25  --  37  --  29   AST (SGOT) U/L  --   --   --  23  --  41*  --  23   GLUCOSE mg/dL 143* 63*  --  148*  < > 177*  < > 218*   < > = values in this interval not displayed.    Results from last 7 days  Lab Units 03/02/18  0319 03/01/18  0505 02/28/18  0444  02/24/18  1238   WBC 10*3/mm3 7.98 9.68 10.88*  < > 19.11*   HEMOGLOBIN g/dL 13.2 13.4 13.5  < > 18.3*   HEMATOCRIT % 39.5 39.3 39.9  < > 55.1*   PLATELETS 10*3/mm3 335 326 230  < > 337   MONOCYTES % %  --   --   --   --  8.0   < > = values in this interval not displayed.  Lab Results   Component Value Date    TROPONINI 0.023 02/26/2018     No results found for: CHOL  No results found for: TRIG  No results found for: HDL  No components found for: LDLCALC  Lab Results   Component Value Date    INR 1.07 03/01/2018    PROTIME 11.2 03/01/2018         Ejection Fraction:  Unknown    Assessment   Assessment:    1.  Atrial flutter                        -asymptomatic                        -new onset; unsure of duration  2.  Diabetes Mellitus                        -DKA on arrival  3.  Duodenal ulcer perforated / pneumoperitoneum                        A.  Surgical repair 2/25/18  4.  Influenza B      Plan:    Now on heparin  KELLEY and cardioversion today    Khadra Mendiola MD  03/02/18  7:35 AM

## 2018-03-02 NOTE — PATIENT CARE CONFERENCE
ICU ROUNDS: PT consult pending. Undergoing KELLEY cardioversion this AM; will plan to initiate therapy this PM.

## 2018-03-02 NOTE — PROGRESS NOTES
Continued Stay Note  [facility]     Patient Name: Alex Talbert  MRN: [id number]  Today's Date: 3/2/2018    Admit Date: 2/24/2018          Discharge Plan       03/02/18 1448    Case Management/Social Work Plan    Plan Home    Additional Comments Spoke with patient and wife at bedside.  Patient plans to go home upon discharge and denies any needs at this time.  CM will continue to follow.              Discharge Codes     None        Expected Discharge Date and Time     Expected Discharge Date Expected Discharge Time    Mar 3, 2018             Sandy Escobedo RN

## 2018-03-02 NOTE — THERAPY EVALUATION
Acute Care - Physical Therapy Initial Evaluation  [facility]     Patient Name: Alex Talbert  : 1955  MRN: [id number]  Today's Date: 3/2/2018   Onset of Illness/Injury or Date of Surgery Date: 18  Date of Referral to PT: 18  Referring Physician: MD Tia      Admit Date: 2018     Visit Dx:    ICD-10-CM ICD-9-CM   1. Influenza B J10.1 487.1   2. Diabetic ketoacidosis without coma associated with other specified diabetes mellitus E13.10 250.12   3. Acute renal failure, unspecified acute renal failure type N17.9 584.9   4. Metabolic acidosis E87.2 276.2   5. Cholecystitis K81.9 575.10   6. Typical atrial flutter I48.3 427.32   7. Impaired functional mobility, balance, gait, and endurance Z74.09 V49.89     Patient Active Problem List   Diagnosis   • Influenza B   • DKA   • Nausea & vomiting   • Diarrhea   • Diabetes mellitus   • Typical atrial flutter   • Perforated duodenal ulcer     Past Medical History:   Diagnosis Date   • Diabetes mellitus      Past Surgical History:   Procedure Laterality Date   • EXPLORATORY LAPAROTOMY N/A 2018    Procedure: LAPAROTOMY EXPLORATORY;  Surgeon: Radha Siu MD;  Location: [facility];  Service:    • TOE AMPUTATION Left           PT ASSESSMENT (last 72 hours)      PT Evaluation       18 1320       Rehab Evaluation    Document Type evaluation  -LS     Subjective Information agree to therapy;complains of;pain  -LS     Patient Effort, Rehab Treatment excellent  -LS     Symptoms Noted During/After Treatment none  -LS     General Information    Patient Profile Review yes  -LS     Onset of Illness/Injury or Date of Surgery Date 18  -LS     Referring Physician MD Tia  -LS     Pertinent History Of Current Problem To [facility] with 5 day hx of increased cough, congestion, SOA, N/V; found to be in DKA with (+) for flu. S/p ex lap with repair of perforated duodenal ulcer with choleycystectomy on ; s/p KELLEY cardioversion 3/2.   -LS      Precautions/Limitations brace on when up;oxygen therapy device and L/min;fall precautions;other (see comments)   MIHIR; abdominal binder.   -LS     Prior Level of Function independent:;gait;transfer;ADL's;bathing;dressing  -LS     Equipment Currently Used at Home other (see comments)   wears orthotic shoes  -LS     Plans/Goals Discussed With patient;spouse/S.O.;agreed upon  -LS     Risks Reviewed patient:;spouse/S.O.:;LOB;dizziness;increased discomfort;change in vital signs  -LS     Benefits Reviewed patient:;spouse/S.O.:;improve function;increase independence;increase strength;increase knowledge  -LS     Barriers to Rehab none identified  -LS     Living Environment    Lives With spouse  -LS     Living Arrangements house  -LS     Home Accessibility stairs to enter home  -LS     Number of Stairs to Enter Home 2  -LS     Clinical Impression    Date of Referral to PT 03/01/18  -LS     PT Diagnosis impaired functional mobility, balance, gait  -LS     Patient/Family Goals Statement return to PLOF; go home  -LS     Criteria for Skilled Therapeutic Interventions Met yes;treatment indicated  -LS     Rehab Potential good, to achieve stated therapy goals  -LS     Vital Signs    Pre Systolic BP Rehab 125  -LS     Pre Treatment Diastolic BP 84  -LS     Post Systolic BP Rehab 115  -LS     Post Treatment Diastolic BP 89  -LS     Pretreatment Heart Rate (beats/min) 85  -LS     Posttreatment Heart Rate (beats/min) 93  -LS     Pre SpO2 (%) 96  -LS     O2 Delivery Pre Treatment supplemental O2  -LS     Post SpO2 (%) 95  -LS     O2 Delivery Post Treatment supplemental O2  -LS     Pre Patient Position Sitting  -LS     Intra Patient Position Standing  -LS     Post Patient Position Sitting  -LS     Pain Assessment    Pain Assessment 0-10  -LS     Pain Score 3  -LS     Post Pain Score 3  -LS     Pain Type Surgical pain  -LS     Pain Location Abdomen  -LS     Pain Intervention(s) Repositioned;Ambulation/increased activity  -LS     Response to  Interventions tolerated  -LS     Cognitive Assessment/Intervention    Current Cognitive/Communication Assessment functional  -LS     Orientation Status oriented x 4  -LS     Follows Commands/Answers Questions able to follow single-step instructions;100% of the time;needs cueing  -LS     Personal Safety good awareness, safety precautions  -LS     Personal Safety Interventions fall prevention program maintained;gait belt;nonskid shoes/slippers when out of bed  -LS     ROM (Range of Motion)    General ROM no range of motion deficits identified   BLEs  -LS     MMT (Manual Muscle Testing)    General MMT Assessment lower extremity strength deficits identified  -LS     Lower Extremity    Lower Ext Manual Muscle Testing Detail BLEs grossly 4-/5  -LS     Bed Mobility, Assessment/Treatment    Bed Mobility, Comment UIC  -LS     Transfer Assessment/Treatment    Transfers, Sit-Stand [place] contact guard assist;verbal cues required  -LS     Transfers, Stand-Sit [place] contact guard assist;verbal cues required  -LS     Transfer, Impairments strength decreased;impaired balance  -LS     Transfer, Comment VC's for hand placement.   -LS     Gait Assessment/Treatment    Gait, [place] Level contact guard assist;verbal cues required  -LS     Gait, Distance (Feet) 300  -LS     Gait, Gait Deviations meron decreased;step length decreased;forward flexed posture  -LS     Gait, Impairments impaired balance  -LS     Gait, Comment Primarily supervision; demonstrated 1 slight LOB requiring CGA to correct. Initially no UE support (noted guarded gait pattern); improved stability with UE support on tele monitor.   -LS     Motor Skills/Interventions    Additional Documentation Balance Skills Training (Group)  -LS     Balance Skills Training    Sitting-Level of Assistance Close supervision  -LS     Sitting-Balance Support Feet supported  -LS     Standing-Level of Assistance Contact guard  -LS     Static Standing Balance Support No  upper extremity supported  -LS     Gait Balance-Level of Assistance Contact guard  -LS     Gait Balance Support Right upper extremity supported  -LS     Sensory Assessment/Intervention    Light Touch RLE;LLE   reports tingling at baseline  -LS     LLE Light Touch mild impairment  -LS     RLE Light Touch mild impairment  -LS     Positioning and Restraints    Pre-Treatment Position sitting in chair/recliner  -LS     Post Treatment Position chair  -LS     In Chair notified nsg;reclined;call light within reach;encouraged to call for assist;exit alarm on;with family/caregiver;with nsg;RUE elevated;LUE elevated;waffle cushion;legs elevated;heels elevated  -LS       User Key  (r) = Recorded By, (t) = Taken By, (c) = Cosigned By    Initials Name Provider Type     Jackie Farmer, PT Physical Therapist          Physical Therapy Education     Title: PT OT SLP Therapies (Active)     Topic: Physical Therapy (Active)     Point: Mobility training (Active)    Learning Progress Summary    Learner Readiness Method Response Comment Documented by Status   Patient Acceptance E,D NR   03/02/18 1543 Active   Significant Other Acceptance E,D NR   03/02/18 1543 Active               Point: Body mechanics (Active)    Learning Progress Summary    Learner Readiness Method Response Comment Documented by Status   Patient Acceptance E,D NR  LS 03/02/18 1543 Active   Significant Other Acceptance E,D NR  LS 03/02/18 1543 Active               Point: Precautions (Active)    Learning Progress Summary    Learner Readiness Method Response Comment Documented by Status   Patient Acceptance E,D NR  LS 03/02/18 1543 Active   Significant Other Acceptance E,D NR  LS 03/02/18 1543 Active                      User Key     Initials Effective Dates Name Provider Type [facility] 06/19/15 -  Jackie Farmer, PT Physical Therapist PT                PT Recommendation and Plan  Anticipated Discharge Disposition: home with assist  PT Frequency: daily  Plan of  Care Review  Plan Of Care Reviewed With: patient, spouse  Outcome Summary/Follow up Plan: PT evaluation completed. Pt demonstrates decreased indep re: functional mobility with evolving signs/symptoms, warranting further skilled PT services to promote PLOF. Limited today by slight decrease in balance during gait. Recommend d/c home with assist.           IP PT Goals       03/02/18 1543          Bed Mobility PT LTG    Bed Mobility PT LTG, Date Established 03/02/18  -LS      Bed Mobility PT LTG, Time to Achieve 2 wks  -LS      Bed Mobility PT LTG, Activity Type supine to sit/sit to supine  -LS      Bed Mobility PT LTG, [place] Level independent  -LS      Transfer Training PT LTG    Transfer Training PT LTG, Date Established 03/02/18  -LS      Transfer Training PT LTG, Time to Achieve 2 wks  -LS      Transfer Training PT LTG, Activity Type sit to stand/stand to sit  -LS      Transfer Training PT LTG, [place] Level independent  -LS      Gait Training PT LTG    Gait Training Goal PT LTG, Date Established 03/02/18  -LS      Gait Training Goal PT LTG, Time to Achieve 2 wks  -LS      Gait Training Goal PT LTG, [place] Level supervision required  -LS      Gait Training Goal PT LTG, Distance to Achieve 300  -LS      Stair Training PT LTG    Stair Training Goal PT LTG, Date Established 03/02/18  -LS      Stair Training Goal PT LTG, Time to Achieve 2 wks  -LS      Stair Training Goal PT LTG, Number of Steps 12  -LS      Stair Training Goal PT LTG, [place] Level supervision required  -LS      Stair Training Goal PT LTG, Assist Device 1 handrail  -LS        User Key  (r) = Recorded By, (t) = Taken By, (c) = Cosigned By    Initials Name Provider Type    LS Jackie Farmer, PT Physical Therapist                Outcome Measures       03/02/18 1320          How much help from another person do you currently need...    Turning from your back to your side while in flat bed without using bedrails? 3  -LS      Moving  from lying on back to sitting on the side of a flat bed without bedrails? 3  -LS      Moving to and from a bed to a chair (including a wheelchair)? 3  -LS      Standing up from a chair using your arms (e.g., wheelchair, bedside chair)? 3  -LS      Climbing 3-5 steps with a railing? 2  -LS      To walk in hospital room? 3  -LS      AM-PAC 6 Clicks Score 17  -LS      Functional Assessment    Outcome Measure Options AM-PAC 6 Clicks Basic Mobility (PT)  -        User Key  (r) = Recorded By, (t) = Taken By, (c) = Cosigned By    Initials Name Provider Type    ELVA Farmer, PT Physical Therapist           Time Calculation:         PT Charges       03/02/18 1549          Time Calculation    Start Time 1320  -      PT Received On 03/02/18  -      PT Goal Re-Cert Due Date 03/12/18  -        User Key  (r) = Recorded By, (t) = Taken By, (c) = Cosigned By    Initials Name Provider Type    ELVA aFrmer, PT Physical Therapist          Therapy Charges for Today     Code Description Service Date Service Provider Modifiers Qty    [id number]  PT EVAL MOD COMPLEXITY 4 3/2/2018 Jackie Farmer, PT GP 1          PT G-Codes  Outcome Measure Options: AM-PAC 6 Clicks Basic Mobility (PT)      Jackie Farmer, PT  3/2/2018

## 2018-03-02 NOTE — PROGRESS NOTES
INTENSIVIST   PROGRESS NOTE     Hospital:  LOS: 6 days   Subjective   Mr. Alex Talbert, 63 y.o. male is followed for:      DKA    Influenza B    Typical atrial flutter    Perforated duodenal ulcer      As an Intensivist, we provide an integrated approach to the ICU patient and family, medical management of comorbid conditions, lead interdisciplinary rounds and coordinate the care with all other services, including those from other specialists.     S     Interval History:  POD: 5 Days Post-Op    Doing Ok  No events of hypoglycemia last night.  KELLEY CV done this morning, to NSR. No complications     The patient's relevant past medical, surgical and social history were reviewed and updated in Epic as appropriate.      ROS:   Constitutional: Negative for fever.   Respiratory: Negative for dyspnea.   Cardiovascular: Negative for chest pain.   Gastrointestinal: Negative for  nausea, vomiting and diarrhea.     Objective   O     Vitals:  Temp: 98.2 °F (36.8 °C) (03/02/18 0800) Temp  Min: 97.9 °F (36.6 °C)  Max: 99.3 °F (37.4 °C)   BP: 98/72 (03/02/18 1120) BP  Min: 98/72  Max: 169/97   Pulse: 67 (03/02/18 1133) Pulse  Min: 66  Max: 154   Resp: 16 (03/02/18 1133) Resp  Min: 16  Max: 20   SpO2: 94 % (03/02/18 1133) SpO2  Min: 90 %  Max: 97 %   Device: nasal cannula with humidification (03/02/18 1131)    Flow Rate: 2 (03/02/18 1131) Flow (L/min)  Min: 2  Max: 2     Intake/Ouptut 24 hrs (7:00AM - 6:59 AM)  Intake & Output (last 3 days)       02/27 0701 - 02/28 0700 02/28 0701 - 03/01 0700 03/01 0701 - 03/02 0700 03/02 0701 - 03/03 0700    I.V. (mL/kg) 1717.1 (19.5) 19.2 (0.2) 350.2 (4.1)     IV Piggyback 388.9 900 1586.8     Total Intake(mL/kg) 2106 (23.9) 919.2 (10.5) 1937 (22.6)     Urine (mL/kg/hr) 1470 (0.7) 1125 (0.5) 2350 (1.1)     Other 298 (0.1) 225 (0.1) 275 (0.1)     Stool  0 (0) 0 (0)     Total Output 1768 1350 2625      Net +338 -430.8 -688              Unmeasured Urine Occurrence   2 x     Unmeasured Stool  Occurrence  5 x 2 x           Medications (drips):    dextrose 5 % and lactated Ringer's Last Rate: 50 mL/hr (03/02/18 0835)   heparin (porcine) Last Rate: 19 Units/kg/hr (03/02/18 0533)   Pharmacy to Dose Heparin      Physical Examination    Telemetry:    .Sinus Rhythm: normal sinus rhythm (per 12 lead) (03/02/18 1119)  Atrial Rhythm: atrial flutter, atrial fibrillation (03/02/18 1000)      Constitutional:  No acute distress.   Cardiovascular: Normal rate, regular and rhythm. Normal heart sounds.  No murmurs, gallop or rub.   Respiratory: No respiratory distress. Normal respiratory effort.  Normal breath sounds  Clear to auscultation and percussion.    Abdominal:  Soft. No masses.  No distension. No HSM.  Post op.   Extremities: No digital cyanosis. No clubbing.  No peripheral edema.   Neurological:   Alert and Oriented to person, place, and time.  Best Eye Response: 4-->(E4) spontaneous (03/02/18 1000)  Best Motor Response: 6-->(M6) obeys commands (03/02/18 1000)  Best Verbal Response: 5-->(V5) oriented (03/02/18 1000)  Dave Coma Scale Score: 15 (03/02/18 1000)   Lines/Drains/Airways: Peripheral IV(s)       Hematology:    Results from last 7 days  Lab Units 03/02/18  0319 03/01/18  0505 02/28/18  0444   WBC 10*3/mm3 7.98 9.68 10.88*   HEMOGLOBIN g/dL 13.2 13.4 13.5   MCV fL 84.4 84.0 83.8   PLATELETS 10*3/mm3 335 326 230     Electrolytes, Magnesium and Phosphorus:    Results from last 7 days  Lab Units 03/02/18  0320 03/01/18  0505 02/28/18  2318 02/28/18  0444 02/27/18  0353   SODIUM mmol/L 144 144  --  143 142   POTASSIUM mmol/L 3.3*  3.3* 3.1* 3.0* 3.5 4.3   CO2 mmol/L 27.0 26.0  --  24.0 19.0*   MAGNESIUM mg/dL 1.9 1.8  --  1.8 2.5   PHOSPHORUS mg/dL 3.6  --   --  2.6 2.8     Renal:    Results from last 7 days  Lab Units 03/02/18  0320 03/01/18  0505 02/28/18  0444   CREATININE mg/dL 0.60 0.60 0.60   BUN mg/dL <5* 6* 7*     Estimated Creatinine Clearance: 156.9 mL/min (by C-G formula based on Cr of 0.6).      Images:    Fl Upper Gi Single Contrast Sbft    Result Date: 2/28/2018  Upper GI series: Status post duodenal ulcer repair. There is mild mucosal irregularity, and luminal narrowing of the distal duodenal bulb, and the first portion of the duodenum, which likely represents postoperative changes, and/or edema. There was no evidence of extraluminal contrast. There was no delay in gastric emptying.  Small bowel follow-through: Small bowel series appeared within normal limits.    This report was finalized on 2/28/2018 4:57 PM by Dr. Chip Brownlee MD.      Results: Reviewed.  I reviewed the patient's new laboratory and imaging results.  I independently reviewed the patient's new images.    Medications: Reviewed.    Assessment/Plan   A / P     63 y.o.male, admitted on 2/24/2018 with Influenza B [J10.1]:     1. Perforated Duodenal ulcer.  Procedure(s) (LRB):  LAPAROTOMY EXPLORATORY (N/A), EMERGENT, REPAIR OF PERFORATED DUODENAL ULCER and CHOLECYSTECTOMY  Dr. Rosalee Vizcaino  2/25/2018  2. Influenza B (2/24/2018), with respiratory symptoms for 5 days prior to admission.  3. A Flutter } [facility]  1. S/p KELLEY-Cardioversion 03/02/18  4. Antibiotics: Piperacillin-Tazobactam +Fluconazole  5. T2 DM, DKA on admission, improved.    Results from last 7 days  Lab Units 03/02/18  1122 03/02/18  0725 03/01/18  2033 03/01/18  1644 03/01/18  1137 03/01/18  0751   GLUCOSE mg/dL 91 104 218* 225* 223* 99       Lab Results  Lab Value Date/Time   HGBA1C 12.40 (H) 02/26/2018 0400   HGBA1C 14.0 (H) 02/24/2018 1238       Nutrition Support:   Active Supplement Orders      Dietary Nutrition Supplements Boost Glucose Control   Diet:  NPO Diet   Advance Directives: Full Code     Assessment / Plan:    1. S/P CV today - to NSR per Dr. Mendiola  2. Anticoagulation per Cardiology  3. Replace K, Mg  4. Disposition: Transfer to Telemetry Unit. if OK with all    Plan of care and goals reviewed during interdisciplinary rounds.  I discussed the patient's  findings and my recommendations with patient, nursing staff and consulting provider     We will follow as needed once out of the Intensive Care Unit.  Hospitalist Team will assist with medical management once on the Floor.    Thank you.     Edy Weber MD, FACP, FCCP, [facility]  Intensive Care Medicine, Nutrition Support and Pulmonary Medicine

## 2018-03-02 NOTE — PLAN OF CARE
Problem: Patient Care Overview (Adult)  Goal: Plan of Care Review  Outcome: Ongoing (interventions implemented as appropriate)   03/02/18 1543   Coping/Psychosocial Response Interventions   Plan Of Care Reviewed With patient;spouse   Outcome Evaluation   Outcome Summary/Follow up Plan PT evaluation completed. Pt demonstrates decreased indep re: functional mobility with evolving signs/symptoms, warranting further skilled PT services to promote PLOF. Limited today by slight decrease in balance during gait. Recommend d/c home with assist.        Problem: Inpatient Physical Therapy  Goal: Bed Mobility Goal LTG- PT  Outcome: Ongoing (interventions implemented as appropriate)   03/02/18 1543   Bed Mobility PT LTG   Bed Mobility PT LTG, Date Established 03/02/18   Bed Mobility PT LTG, Time to Achieve 2 wks   Bed Mobility PT LTG, Activity Type supine to sit/sit to supine   Bed Mobility PT LTG, [place] Level independent     Goal: Transfer Training Goal 1 LTG- PT  Outcome: Ongoing (interventions implemented as appropriate)   03/02/18 1543   Transfer Training PT LTG   Transfer Training PT LTG, Date Established 03/02/18   Transfer Training PT LTG, Time to Achieve 2 wks   Transfer Training PT LTG, Activity Type sit to stand/stand to sit   Transfer Training PT LTG, [place] Level independent     Goal: Gait Training Goal LTG- PT  Outcome: Ongoing (interventions implemented as appropriate)   03/02/18 1543   Gait Training PT LTG   Gait Training Goal PT LTG, Date Established 03/02/18   Gait Training Goal PT LTG, Time to Achieve 2 wks   Gait Training Goal PT LTG, [place] Level supervision required   Gait Training Goal PT LTG, Distance to Achieve 300     Goal: Stair Training Goal LTG- PT  Outcome: Ongoing (interventions implemented as appropriate)   03/02/18 1543   Stair Training PT LTG   Stair Training Goal PT LTG, Date Established 03/02/18   Stair Training Goal PT LTG, Time to Achieve 2 wks   Stair Training Goal PT LTG,  Number of Steps 12   Stair Training Goal PT LTG, [place] Level supervision required   Stair Training Goal PT LTG, Assist Device 1 handrail

## 2018-03-03 LAB
ANION GAP SERPL CALCULATED.3IONS-SCNC: 3 MMOL/L (ref 3–11)
BACTERIA SPEC AEROBE CULT: NORMAL
BUN BLD-MCNC: 5 MG/DL (ref 9–23)
BUN/CREAT SERPL: 7.1 (ref 7–25)
CALCIUM SPEC-SCNC: 8.7 MG/DL (ref 8.7–10.4)
CHLORIDE SERPL-SCNC: 107 MMOL/L (ref 99–109)
CO2 SERPL-SCNC: 29 MMOL/L (ref 20–31)
CREAT BLD-MCNC: 0.7 MG/DL (ref 0.6–1.3)
GFR SERPL CREATININE-BSD FRML MDRD: 115 ML/MIN/1.73
GLUCOSE BLD-MCNC: 109 MG/DL (ref 70–100)
GLUCOSE BLDC GLUCOMTR-MCNC: 172 MG/DL (ref 70–130)
GLUCOSE BLDC GLUCOMTR-MCNC: 223 MG/DL (ref 70–130)
GLUCOSE BLDC GLUCOMTR-MCNC: 239 MG/DL (ref 70–130)
GLUCOSE BLDC GLUCOMTR-MCNC: 75 MG/DL (ref 70–130)
MAGNESIUM SERPL-MCNC: 2 MG/DL (ref 1.3–2.7)
POTASSIUM BLD-SCNC: 3.2 MMOL/L (ref 3.5–5.5)
POTASSIUM BLD-SCNC: 4.3 MMOL/L (ref 3.5–5.5)
SODIUM BLD-SCNC: 139 MMOL/L (ref 132–146)

## 2018-03-03 PROCEDURE — 93005 ELECTROCARDIOGRAM TRACING: CPT | Performed by: PHYSICIAN ASSISTANT

## 2018-03-03 PROCEDURE — 84132 ASSAY OF SERUM POTASSIUM: CPT | Performed by: INTERNAL MEDICINE

## 2018-03-03 PROCEDURE — 93010 ELECTROCARDIOGRAM REPORT: CPT | Performed by: INTERNAL MEDICINE

## 2018-03-03 PROCEDURE — 80048 BASIC METABOLIC PNL TOTAL CA: CPT | Performed by: INTERNAL MEDICINE

## 2018-03-03 PROCEDURE — 82962 GLUCOSE BLOOD TEST: CPT

## 2018-03-03 PROCEDURE — 99232 SBSQ HOSP IP/OBS MODERATE 35: CPT | Performed by: INTERNAL MEDICINE

## 2018-03-03 PROCEDURE — 99233 SBSQ HOSP IP/OBS HIGH 50: CPT | Performed by: INTERNAL MEDICINE

## 2018-03-03 PROCEDURE — 83735 ASSAY OF MAGNESIUM: CPT | Performed by: INTERNAL MEDICINE

## 2018-03-03 PROCEDURE — 25010000002 ONDANSETRON PER 1 MG: Performed by: SURGERY

## 2018-03-03 PROCEDURE — 63710000001 INSULIN DETEMIR PER 5 UNITS: Performed by: INTERNAL MEDICINE

## 2018-03-03 RX ADMIN — ONDANSETRON HYDROCHLORIDE 4 MG: 2 INJECTION, SOLUTION INTRAMUSCULAR; INTRAVENOUS at 12:51

## 2018-03-03 RX ADMIN — POTASSIUM CHLORIDE 40 MEQ: 750 CAPSULE, EXTENDED RELEASE ORAL at 08:36

## 2018-03-03 RX ADMIN — PANTOPRAZOLE SODIUM 40 MG: 40 TABLET, DELAYED RELEASE ORAL at 05:47

## 2018-03-03 RX ADMIN — INSULIN LISPRO 6 UNITS: 100 INJECTION, SOLUTION INTRAVENOUS; SUBCUTANEOUS at 22:10

## 2018-03-03 RX ADMIN — PANTOPRAZOLE SODIUM 40 MG: 40 TABLET, DELAYED RELEASE ORAL at 17:16

## 2018-03-03 RX ADMIN — APIXABAN 5 MG: 5 TABLET, FILM COATED ORAL at 22:10

## 2018-03-03 RX ADMIN — INSULIN DETEMIR 12 UNITS: 100 INJECTION, SOLUTION SUBCUTANEOUS at 22:11

## 2018-03-03 RX ADMIN — INSULIN LISPRO 2 UNITS: 100 INJECTION, SOLUTION INTRAVENOUS; SUBCUTANEOUS at 12:51

## 2018-03-03 RX ADMIN — APIXABAN 5 MG: 5 TABLET, FILM COATED ORAL at 08:36

## 2018-03-03 RX ADMIN — POTASSIUM CHLORIDE 40 MEQ: 750 CAPSULE, EXTENDED RELEASE ORAL at 12:51

## 2018-03-03 RX ADMIN — INSULIN LISPRO 4 UNITS: 100 INJECTION, SOLUTION INTRAVENOUS; SUBCUTANEOUS at 17:19

## 2018-03-03 NOTE — PROGRESS NOTES
" [facility] at [facility] - Progress Note    Alex Talbert  1955  [id number]    03/03/18, 11:09 AM  Chief Complaint: Following for Atrial Flutter  Subjective:   Patient has moved out of ICU to tele bed.  Doing okay today - feeling better but still weak.  Still with MIHIR bulb that is draining.  Had some difficulty eating this morning - early fullness and some bloating.    Review of Systems:  Pertinent positives are listed above and in physical exam.  All others have been reviewed and are negative.      apixaban 5 mg Oral Q12H   insulin detemir 15 Units Subcutaneous Nightly   insulin lispro 0-9 Units Subcutaneous 4x Daily With Meals & Nightly   pantoprazole 40 mg Oral BID AC       Objective:  Vitals:   height is 182.9 cm (72\") and weight is 83.6 kg (184 lb 3.2 oz). His oral temperature is 97.3 °F (36.3 °C). His blood pressure is 154/87 and his pulse is 89. His respiration is 18 and oxygen saturation is 94%.     Intake/Output Summary (Last 24 hours) at 03/03/18 1109  Last data filed at 03/03/18 0622   Gross per 24 hour   Intake                0 ml   Output              405 ml   Net             -405 ml       Physical Exam:  General:  WN, NAD, A and O x3.  CV:  Normal S1,S2. No murmur, rub, or gallop.  Resp:  CTA [place], equal, non-labored.  Abd:  Soft, + BS, no organomegaly. Non-tender to palpation.  Extrem:  No edema BLE, 2+ pedal/PT pulses.            Results from last 7 days  Lab Units 03/02/18  0319   WBC 10*3/mm3 7.98   HEMOGLOBIN g/dL 13.2   HEMATOCRIT % 39.5   PLATELETS 10*3/mm3 335       Results from last 7 days  Lab Units 03/03/18  0515  02/28/18  0444   SODIUM mmol/L 139  < > 143   POTASSIUM mmol/L 3.2*  < > 3.5   CHLORIDE mmol/L 107  < > 110*   CO2 mmol/L 29.0  < > 24.0   BUN mg/dL 5*  < > 7*   CREATININE mg/dL 0.70  < > 0.60   CALCIUM mg/dL 8.7  < > 8.4*   BILIRUBIN mg/dL  --   --  1.0   ALK PHOS U/L  --   --  61   ALT (SGPT) U/L  --   --  25   AST (SGOT) U/L  --   --  23   GLUCOSE mg/dL 109*  < " > 148*   < > = values in this interval not displayed.    Results from last 7 days  Lab Units 03/01/18  1149   INR  1.07                   Tele:  NSR    Assessment and Plan:  1.  Atrial flutter   -  asymptomatic              -  new onset; unsure of duration   -  S/P KELLEY and ECV 3/2/18, Dr. Mendiola.  EF 55% by KELLEY.   -  On Eliquis for CHADS2 Vasc = 1. No AAD, will add BB if able in the near future for BP as well as with Aflutter history. In NSR today   -  Check EKG today. Will also get one in am.    2.  Diabetes Mellitus   -  DKA on arrival   -  Per Hospitalists.    3.  Duodenal ulcer perforated / pneumoperitoneum   -  Surgical repair 2/25/18   -  MIHIR bulb still draining.  Per MDs.    4.  Influenza B    5.  Hypokalemia   -  Replace per protocol.   -  Re check am labs.  Per hospitalists.  6.  Essential Hypertension   -  Now that DKA resolved, SBP has slowly been increasing   -  Continue to monitor for the next 24 hours.  IF remains high, consider beta blocker            I discussed the patients findings and my recommendations with the patient, any present family members, and the nursing staff.  Awais Rodriguez MD saw and examined patient, verified hx and PE, read all radiographic studies, reviewed labs and micro data, and formulated dx, plan for treatment and all medical decision making.      Cielo Woodruff PA-C  03/03/18, 11:09 AM    I, Awais Rodriguez, have reviewed the note in full and agree with all aspects of the above including physical exam, assessment, labs and plan with changes made accordingly. Face to Face Time was spent with the patient.    Awais Rodriguez DO  03/03/18  12:24 PM        EMR Dragon/Transcription disclaimer:   Much of this encounter note is an electronic transcription/translation of spoken language to printed text. The electronic translation of spoken language may permit erroneous, or at times, nonsensical words or phrases to be inadvertently transcribed; Although I have reviewed the  note for such errors, some may still exist.

## 2018-03-03 NOTE — PLAN OF CARE
Problem: Sepsis (Adult)  Goal: Signs and Symptoms of Listed Potential Problems Will be Absent or Manageable (Sepsis)  Outcome: Ongoing (interventions implemented as appropriate)   03/02/18 0516 03/03/18 0423   Sepsis   Problems Assessed (Sepsis) --  all   Problems Present (Sepsis) none --

## 2018-03-03 NOTE — PLAN OF CARE
Problem: Patient Care Overview (Adult)  Goal: Plan of Care Review  Outcome: Ongoing (interventions implemented as appropriate)   03/02/18 0516 03/03/18 1514   Coping/Psychosocial Response Interventions   Plan Of Care Reviewed With --  patient   Patient Care Overview   Progress improving --    Outcome Evaluation   Outcome Summary/Follow up Plan --  Patient complains of intermittent nausea after meals. Patient able to ambulate without assistance, denies pain. Dressing changed per order and patient tolerated well.     Goal: Adult Individualization and Mutuality  Outcome: Ongoing (interventions implemented as appropriate)    Goal: Discharge Needs Assessment  Outcome: Ongoing (interventions implemented as appropriate)   02/26/18 1641 03/02/18 1320   Discharge Needs Assessment   Concerns To Be Addressed no discharge needs identified --    Readmission Within The Last 30 Days no previous admission in last 30 days --    Equipment Needed After Discharge none --    Discharge Disposition still a patient --    Current Health   Anticipated Changes Related to Illness none --    Self-Care   Equipment Currently Used at Home --  other (see comments)  (wears orthotic shoes)

## 2018-03-03 NOTE — PROGRESS NOTES
"Alex Talbert  1955  [id number]    Surgery Progress Note    Date of visit: 3/3/2018    Subjective: Better  Tolerating regular diet  Ambulating  Denies abdominal pain    Objective:    /87 (BP Location: Right arm, Patient Position: Sitting)  Pulse 89  Temp 97.3 °F (36.3 °C) (Oral)   Resp 18  Ht 182.9 cm (72\")  Wt 83.6 kg (184 lb 3.2 oz)  SpO2 94%  BMI 24.98 kg/m2    Intake/Output Summary (Last 24 hours) at 03/03/18 0934  Last data filed at 03/03/18 0622   Gross per 24 hour   Intake                0 ml   Output              405 ml   Net             -405 ml       CV: Regular rate and rhythm  L: normal air entry    Abd: Soft and nontender  Wound is clean and packed      LABS:      Results from last 7 days  Lab Units 03/02/18  0319   WBC 10*3/mm3 7.98   HEMOGLOBIN g/dL 13.2   HEMATOCRIT % 39.5   PLATELETS 10*3/mm3 335       Results from last 7 days  Lab Units 03/03/18  0515  02/28/18  0444   SODIUM mmol/L 139  < > 143   POTASSIUM mmol/L 3.2*  < > 3.5   CHLORIDE mmol/L 107  < > 110*   CO2 mmol/L 29.0  < > 24.0   BUN mg/dL 5*  < > 7*   CREATININE mg/dL 0.70  < > 0.60   CALCIUM mg/dL 8.7  < > 8.4*   BILIRUBIN mg/dL  --   --  1.0   ALK PHOS U/L  --   --  61   ALT (SGPT) U/L  --   --  25   AST (SGOT) U/L  --   --  23   GLUCOSE mg/dL 109*  < > 148*   < > = values in this interval not displayed.    Results from last 7 days  Lab Units 03/03/18  0515   SODIUM mmol/L 139   POTASSIUM mmol/L 3.2*   CHLORIDE mmol/L 107   CO2 mmol/L 29.0   BUN mg/dL 5*   CREATININE mg/dL 0.70   GLUCOSE mg/dL 109*   CALCIUM mg/dL 8.7       Lab Results  Lab Value Date/Time   LIPASE 38 02/24/2018 1238         Assessment/ Plan: Stable course status post emergent abdominal exploration and repair of perforated duodenal ulcer as well as a cholecystectomy  Progressing nicely  Plan to discontinue antibiotics and Diflucan  DC Anam-Hidalgo drain once drainage slows down and hopefully home on Monday    Problem List Items Addressed This Visit  "    Influenza B - Primary    Typical atrial flutter    Relevant Medications    digoxin (LANOXIN) injection 500 mcg (Completed)    apixaban (ELIQUIS) tablet 5 mg    Other Relevant Orders    Electrical Cardioversion (Completed)      Other Visit Diagnoses     Diabetic ketoacidosis without coma associated with other specified diabetes mellitus        Acute renal failure, unspecified acute renal failure type        Metabolic acidosis        Cholecystitis        Relevant Orders    Tissue Pathology Exam (Completed)            Radha Siu MD  3/3/2018  9:34 AM

## 2018-03-03 NOTE — PLAN OF CARE
Problem: Sepsis (Adult)  Goal: Signs and Symptoms of Listed Potential Problems Will be Absent or Manageable (Sepsis)  Outcome: Ongoing (interventions implemented as appropriate)   03/02/18 0516 03/03/18 0424   Sepsis   Problems Assessed (Sepsis) --  all   Problems Present (Sepsis) none --

## 2018-03-03 NOTE — PROGRESS NOTES
[facility] Medicine Services  PROGRESS NOTE    Patient Name: Alex Talbert  : 1955  MRN: [id number]    Date of Admission: 2018  Length of Stay: 7  Primary Care Physician: Dayo Richardson MD    Subjective   Subjective     CC:  F/u perf duodenal ulcer    HPI:  Wife at bedside.  Doing well.  MIHIR drain output decreasing.  Tolerating a diet.  Pain ok.  Glc better.  Was not on any meds at home.    Review of Systems   Constitutional: Negative for fever.   Respiratory: Negative for shortness of breath.    Gastrointestinal: Positive for abdominal pain.   Neurological: Negative for weakness.   All other systems reviewed and are negative.    Otherwise ROS is negative except as mentioned in the HPI.    Objective   Objective     Vital Signs:   Temp:  [97.3 °F (36.3 °C)-99.4 °F (37.4 °C)] 98.5 °F (36.9 °C)  Heart Rate:  [71-89] 75  Resp:  [18] 18  BP: (123-154)/(79-90) 147/85        Physical Exam:  Constitutional: No acute distress, awake, alert  HENT: NCAT, mucous membranes moist  Respiratory: Clear to auscultation bilaterally, respiratory effort normal   Cardiovascular: RRR, no murmurs, rubs, or gallops  Gastrointestinal: Positive bowel sounds, soft, mild tenderness, drain in place with cloudy serous fluid, nondistended.  Incision c/d/i  Musculoskeletal: No bilateral ankle edema  Psychiatric: Appropriate affect, cooperative  Neurologic: Oriented x 3, strength symmetric in all extremities, Cranial Nerves grossly intact to confrontation, speech clear  Skin: No rashes      Results Reviewed:  I have personally reviewed current lab, radiology, and data and agree.      Results from last 7 days  Lab Units 18  0319 18  1149 18  0505 18  0444   WBC 10*3/mm3 7.98  --  9.68 10.88*   HEMOGLOBIN g/dL 13.2  --  13.4 13.5   HEMATOCRIT % 39.5  --  39.3 39.9   PLATELETS 10*3/mm3 335  --  326 230   INR   --  1.07  --   --        Results from last 7 days  Lab Units  03/03/18  0515 03/02/18  1759 03/02/18  0320 03/01/18  0505  02/28/18  0444  02/26/18  0401  02/25/18  0331   SODIUM mmol/L 139  --  144 144  --  143  < > 136  < > 135   POTASSIUM mmol/L 3.2* 3.9 3.3*  3.3* 3.1*  < > 3.5  < > 3.9  < > 4.1   CHLORIDE mmol/L 107  --  111* 109  --  110*  < > 115*  < > 113*   CO2 mmol/L 29.0  --  27.0 26.0  --  24.0  < > 15.0*  < > 13.0*   BUN mg/dL 5*  --  <5* 6*  --  7*  < > 8*  < > 22   CREATININE mg/dL 0.70  --  0.60 0.60  --  0.60  < > 0.60  < > 1.00   GLUCOSE mg/dL 109*  --  143* 63*  --  148*  < > 177*  < > 218*   CALCIUM mg/dL 8.7  --  8.2* 8.2*  --  8.4*  < > 8.2*  < > 8.4*   ALT (SGPT) U/L  --   --   --   --   --  25  --  37  --  29   AST (SGOT) U/L  --   --   --   --   --  23  --  41*  --  23   TROPONIN I ng/mL  --   --   --   --   --   --   --  0.023  --   --    < > = values in this interval not displayed.  Estimated Creatinine Clearance: 131 mL/min (by C-G formula based on Cr of 0.7).  No results found for: BNP  No results found for: PHART    Microbiology Results Abnormal     Procedure Component Value - Date/Time    Blood Culture - Blood, Blood, Venous Line [[id number]]  (Normal) Collected:  02/26/18 0003    Lab Status:  Final result Specimen:  Blood from Blood, Arterial Line Updated:  03/03/18 0746     Blood Culture No growth at 5 days    Blood Culture - Blood, Blood, Venous Line [[id number]] Collected:  02/25/18 2049    Lab Status:  Final result Specimen:  Blood from Blood, Venous Line Updated:  03/02/18 2216     Blood Culture No growth at 5 days      Aerobic bottle only    Blood Culture - Blood, [[id number]]  (Normal) Collected:  02/24/18 1235    Lab Status:  Final result Specimen:  Blood from Wrist, Left Updated:  03/01/18 1301     Blood Culture No growth at 5 days    Blood Culture - Blood, [[id number]]  (Normal) Collected:  02/24/18 1230    Lab Status:  Final result Specimen:  Blood from Wrist, Right Updated:  03/01/18 1301     Blood Culture No growth at 5 days     Influenza A & B, RT PCR - Swab, Nasopharynx [[id number]]  (Abnormal) Collected:  02/24/18 1227    Lab Status:  Final result Specimen:  Swab from Nasopharynx Updated:  02/24/18 1330     Influenza A PCR Not Detected     Influenza B PCR Detected (A)          Imaging Results (last 24 hours)     ** No results found for the last 24 hours. **        Results for orders placed during the hospital encounter of 02/24/18   Adult Transesophageal Echo ([facility]) W/ Cont if Necessary Per Protocol    Narrative · Left ventricular systolic function is normal. Estimated EF = 55%.  · Trace-to-mild mitral valve regurgitation is present  · No thrombus is seen in left atrium or left atrial appendage          I have reviewed the medications.    Assessment/Plan   Assessment / Plan     Hospital Problem List     * (Principal)Perforated duodenal ulcer    Overview Signed 2/26/2018 10:14 AM by Lazaro Esposito MD     Repair with patch 2/25/18 and cholecystectomy           Typical atrial flutter    Influenza B    DKA    Nausea & vomiting    Diarrhea    Type 2 diabetes mellitus    Overview Signed 2/24/2018  2:22 PM by MIRANDA Pena     Controlled with diet.                  Brief Hospital Course to date:  Alex Talbert is a 63 y.o. male admitted on February 24 with DKA into the ICU.  Subsequently developed abdominal pain and imaging showed free air with probable perforated duodenal ulcer.  When for emergent laparotomy with Dr. HASTINGS where duodenal ulcer repair was done along with a cholecystectomy.  Perioperatively also had some new a flutter.  Was cardioverted on March 2 and has maintained sinus rhythm since that time.  Hospitalists assumed care on March 3.      Assessment & Plan:  - doing well from surgical standpoint.  Tolerating diet.  Drain out when ok with surgery.  - plan PPI bid x 1 month then daily forever  - s/p tamiflu for flu  - DKA resolved, a1c=14% on admission.  Seen by educator.  Seems responsive to lower insulin  amounts.  Plan on at least basal coverage at discharge.  - s/p ECV, maintaining NSR.  On eliquis.  Possbily add BB soon if BP remains high.  - d/w wife and patient.    DVT Prophylaxis:  eliquis    CODE STATUS: Full Code    Disposition: I expect the patient to be discharged home Monday    Electronically signed by Royal Odom MD, 03/03/18, 4:12 PM.

## 2018-03-04 LAB
GLUCOSE BLDC GLUCOMTR-MCNC: 195 MG/DL (ref 70–130)
GLUCOSE BLDC GLUCOMTR-MCNC: 208 MG/DL (ref 70–130)
GLUCOSE BLDC GLUCOMTR-MCNC: 238 MG/DL (ref 70–130)
GLUCOSE BLDC GLUCOMTR-MCNC: 99 MG/DL (ref 70–130)

## 2018-03-04 PROCEDURE — 99232 SBSQ HOSP IP/OBS MODERATE 35: CPT | Performed by: INTERNAL MEDICINE

## 2018-03-04 PROCEDURE — 63710000001 INSULIN DETEMIR PER 5 UNITS: Performed by: INTERNAL MEDICINE

## 2018-03-04 PROCEDURE — 82962 GLUCOSE BLOOD TEST: CPT

## 2018-03-04 PROCEDURE — 63710000001 INSULIN LISPRO (HUMAN) PER 5 UNITS: Performed by: INTERNAL MEDICINE

## 2018-03-04 PROCEDURE — 97116 GAIT TRAINING THERAPY: CPT

## 2018-03-04 RX ADMIN — INSULIN LISPRO 3 UNITS: 100 INJECTION, SOLUTION INTRAVENOUS; SUBCUTANEOUS at 17:43

## 2018-03-04 RX ADMIN — INSULIN DETEMIR 10 UNITS: 100 INJECTION, SOLUTION SUBCUTANEOUS at 21:14

## 2018-03-04 RX ADMIN — PANTOPRAZOLE SODIUM 40 MG: 40 TABLET, DELAYED RELEASE ORAL at 08:22

## 2018-03-04 RX ADMIN — INSULIN LISPRO 2 UNITS: 100 INJECTION, SOLUTION INTRAVENOUS; SUBCUTANEOUS at 21:14

## 2018-03-04 RX ADMIN — INSULIN LISPRO 4 UNITS: 100 INJECTION, SOLUTION INTRAVENOUS; SUBCUTANEOUS at 12:08

## 2018-03-04 RX ADMIN — PANTOPRAZOLE SODIUM 40 MG: 40 TABLET, DELAYED RELEASE ORAL at 17:44

## 2018-03-04 RX ADMIN — APIXABAN 5 MG: 5 TABLET, FILM COATED ORAL at 21:14

## 2018-03-04 RX ADMIN — APIXABAN 5 MG: 5 TABLET, FILM COATED ORAL at 08:22

## 2018-03-04 RX ADMIN — INSULIN LISPRO 4 UNITS: 100 INJECTION, SOLUTION INTRAVENOUS; SUBCUTANEOUS at 17:43

## 2018-03-04 NOTE — PROGRESS NOTES
"Patient Name:  Alex Talbert  YOB: 1955  [id number]    Surgery Progress Note    Date of visit: 3/4/2018    Subjective   Subjective: Feeling better. Tolerating PO, minimal pain         Objective     Objective:     /80 (BP Location: Left arm, Patient Position: Lying)  Pulse 65  Temp 98.2 °F (36.8 °C) (Oral)   Resp 16  Ht 182.9 cm (72\")  Wt 83.6 kg (184 lb 3.2 oz)  SpO2 93%  BMI 24.98 kg/m2    Intake/Output Summary (Last 24 hours) at 03/04/18 0859  Last data filed at 03/03/18 1600   Gross per 24 hour   Intake              240 ml   Output               90 ml   Net              150 ml       CV:  Rhythm  regular and rate regular   L:  Clear  to auscultation bilaterally   Abd:  Bowel sounds positive , soft, dressings c/d/i, MIHIR serous, scant  Ext:  No cyanosis, clubbing, edema    Labs that are back at this time have been reviewed.        Assessment/Plan     Assessment/ Plan:    Hospital Problem List     * (Principal)Perforated duodenal ulcer - Doing well, tolerating diet. Hopefully home in 1-2 days.    Overview Signed 2/26/2018 10:14 AM by Lazaro Esposito MD     Repair with patch 2/25/18 and cholecystectomy           Influenza B    DKA        Nausea & vomiting    Diarrhea    Type 2 diabetes mellitus    Overview Signed 2/24/2018  2:22 PM by MIRANDA Pena     Controlled with diet.             Typical atrial flutter              Hugo Cook MD  3/4/2018  8:59 AM      "

## 2018-03-04 NOTE — PROGRESS NOTES
[facility]  Cardiovascular Consultation Note  Alex Talbert  [id number]  1955    DATE OF ADMISSION: 2/24/2018  DATE OF FOLLOW UP: 3/4/2018    Dayo Richardson MD    Subjective:     Patient ID: Alex Talbert is a 63 y.o. male.    Chief Complaint:   Chief Complaint   Patient presents with   • Shortness of Breath  Aflutter       No Known Allergies    Current Facility-Administered Medications:   •  [DISCONTINUED] acetaminophen (TYLENOL) tablet 650 mg, 650 mg, Oral, Q4H PRN **OR** acetaminophen (TYLENOL) suppository 650 mg, 650 mg, Rectal, Q4H PRN, Joe Armstrong APRN  •  apixaban (ELIQUIS) tablet 5 mg, 5 mg, Oral, Q12H, Maryann Bar V, APRN, 5 mg at 03/04/18 0822  •  HYDROmorphone (DILAUDID) injection 0.5 mg, 0.5 mg, Intravenous, Q1H PRN, 0.5 mg at 02/28/18 0927 **AND** [DISCONTINUED] naloxone (NARCAN) injection 0.1 mg, 0.1 mg, Intravenous, Q5 Min PRN, Eloise Cooeny, APRN  •  insulin detemir (LEVEMIR) injection 12 Units, 12 Units, Subcutaneous, Nightly, Royal Odom MD, 12 Units at 03/03/18 2211  •  insulin lispro (humaLOG) injection 0-9 Units, 0-9 Units, Subcutaneous, 4x Daily With Meals & Nightly, Edy Weber MD, 4 Units at 03/04/18 1208  •  ipratropium-albuterol (DUO-NEB) nebulizer solution 3 mL, 3 mL, Nebulization, Q6H PRN, Joe Armstrong, APRN  •  magnesium sulfate 2g/50 mL (PREMIX) infusion, 6 g, Intravenous, PRN, Edy Weber MD  •  magnesium sulfate 4g/100mL (PREMIX) infusion, 4 g, Intravenous, PRN, Edy Weber MD, 4 g at 03/02/18 0533  •  metoprolol tartrate (LOPRESSOR) injection 5 mg, 5 mg, Intravenous, Q6H PRN, Giancarlo Correa MD, 5 mg at 03/02/18 1104  •  [DISCONTINUED] ondansetron (ZOFRAN) tablet 4 mg, 4 mg, Oral, Q6H PRN **OR** ondansetron (ZOFRAN) injection 4 mg, 4 mg, Intravenous, Q6H PRN, Radha Siu MD, 4 mg at 03/03/18 1251  •  pantoprazole (PROTONIX) EC tablet 40 mg, 40 mg, Oral, BID AC, Tiffany Rangel [facility], 40 mg at 03/04/18  0822  •  potassium chloride (MICRO-K) CR capsule 40 mEq, 40 mEq, Oral, PRN, Kojo Ray APRN, 40 mEq at 03/03/18 1251  •  potassium phosphate 15 mmol in sodium chloride 0.9 % 100 mL infusion, 15 mmol, Intravenous, PRN, Edy Weber MD  •  potassium phosphate 30 mmol in sodium chloride 0.9 % 500 mL infusion, 30 mmol, Intravenous, PRN, Edy Weber MD, 30 mmol at 02/26/18 2007  •  potassium phosphate 45 mmol in sodium chloride 0.9 % 500 mL infusion, 45 mmol, Intravenous, PRN, Edy Weber MD  •  sodium chloride 0.9 % flush 1-10 mL, 1-10 mL, Intravenous, PRN, MIRANDA Pena    History of Present Illness  Doing ok today. Sitting up in the chair. No major new compliants    The following portions of the patient's history were reviewed and updated as appropriate: allergies, current medications, past family history, past medical history, past social history, past surgical history and problem list.    ROS   14 point ROS negative except as outlined in problem list, HPI and other parts of the note.    Procedures       Objective:       Vitals:    03/03/18 2318 03/04/18 0350 03/04/18 0738 03/04/18 1100   BP: 144/89 143/84 130/80 141/84   BP Location: Left arm Left arm Left arm Left arm   Patient Position: Lying Lying Lying Lying   Pulse: 72 65     Resp: 18 18 16 18   Temp: 98.8 °F (37.1 °C) 98.9 °F (37.2 °C) 98.2 °F (36.8 °C) 97.7 °F (36.5 °C)   TempSrc: Oral Oral Oral Oral   SpO2: 95% 92% 93%    Weight:       Height:           Intake/Output Summary (Last 24 hours) at 03/04/18 1348  Last data filed at 03/04/18 1100   Gross per 24 hour   Intake                0 ml   Output              180 ml   Net             -180 ml     General:  WN, NAD, A and O x3.  CV:  Normal S1,S2. No murmur, rub, or gallop.  Resp:  CTA Livan, equal, non-labored.  Abd:  Soft, + BS, no organomegaly. Non-tender to palpation.  Extrem:  No edema BLE, 2+ pedal/PT pulses.     The patient's old records including ambulatory rhythm recordings  (ECGs, Holter/event monitor) were reviewed and discussed.      Lab Review:     Results from last 7 days  Lab Units 03/03/18  1909 03/03/18  0515 03/02/18  1759 03/02/18  0320 03/01/18  0505   SODIUM mmol/L  --  139  --  144 144   POTASSIUM mmol/L 4.3 3.2* 3.9 3.3*  3.3* 3.1*   CHLORIDE mmol/L  --  107  --  111* 109   CO2 mmol/L  --  29.0  --  27.0 26.0   BUN mg/dL  --  5*  --  <5* 6*   CREATININE mg/dL  --  0.70  --  0.60 0.60   GLUCOSE mg/dL  --  109*  --  143* 63*   CALCIUM mg/dL  --  8.7  --  8.2* 8.2*       Results from last 7 days  Lab Units 02/26/18  0401   TROPONIN I ng/mL 0.023       Results from last 7 days  Lab Units 03/02/18  0319 03/01/18  0505 02/28/18  0444   WBC 10*3/mm3 7.98 9.68 10.88*   HEMOGLOBIN g/dL 13.2 13.4 13.5   HEMATOCRIT % 39.5 39.3 39.9   PLATELETS 10*3/mm3 335 326 230       Results from last 7 days  Lab Units 03/02/18  1201 03/02/18  0320 03/01/18  1629 03/01/18  1149   INR   --   --   --  1.07   APTT seconds 49.9* 38.9* 29.2* 47.1*           Results from last 7 days  Lab Units 03/03/18  0515   MAGNESIUM mg/dL 2.0         TELE; NSR            Assessment & Plan:   1.  Atrial flutter                        -  asymptomatic                        -   new onset; unsure of duration                        -  S/P KELLEY and ECV 3/2/18, Dr. Mendiola.  EF 55% by [facility]ELLEY.                        -  On Eliquis for CHADS2 Vasc = 1. No AAD, on Metoprolol. In NSR today. EKG ok                          2.  Diabetes Mellitus                        -  DKA on arrival                        -  Per Hospitalists.     3.  Duodenal ulcer perforated / pneumoperitoneum                        -  Surgical repair 2/25/18                        -  MIHIR bulb still draining.  Per MDs.    4.  Hypokalemia                        -  Replace per protocol.                        -  Now back to normal    5.  Essential Hypertension                        -  Now that DKA resolved, SBP has slowly been increasing                         -  Continue to monitor for the next 24 hours.  IF remains high, consider beta blocker            Awais Rodriguez DO  03/04/18  1:48 PM        EMR Dragon/Transcription disclaimer:  Much of this encounter note is an electronic transcription/translation of spoken language to printed text. Electronic translation of spoken language may permit erroneous, or at times, nonsensical words or phrases to be inadvertently transcribed. Although I have reviewed the note for such errors, some may still exist.

## 2018-03-04 NOTE — THERAPY TREATMENT NOTE
Acute Care - Physical Therapy Treatment Note  [facility]     Patient Name: Alex Talbert  : 1955  MRN: [id number]  Today's Date: 3/4/2018  Onset of Illness/Injury or Date of Surgery Date: 18  Date of Referral to PT: 18  Referring Physician: MD Tia    Admit Date: 2018    Visit Dx:    ICD-10-CM ICD-9-CM   1. Influenza B J10.1 487.1   2. Diabetic ketoacidosis without coma associated with other specified diabetes mellitus E13.10 250.12   3. Acute renal failure, unspecified acute renal failure type N17.9 584.9   4. Metabolic acidosis E87.2 276.2   5. Cholecystitis K81.9 575.10   6. Typical atrial flutter I48.3 427.32   7. Impaired functional mobility, balance, gait, and endurance Z74.09 V49.89     Patient Active Problem List   Diagnosis   • Influenza B   • DKA   • Nausea & vomiting   • Diarrhea   • Type 2 diabetes mellitus   • Typical atrial flutter   • Perforated duodenal ulcer               Adult Rehabilitation Note       18 0846          Rehab Assessment/Intervention    Discipline physical therapist  -LR      Document Type therapy note (daily note)  -LR      Subjective Information agree to therapy;no complaints  -LR      Patient Effort, Rehab Treatment excellent  -LR      Symptoms Noted During/After Treatment none  -LR      Precautions/Limitations fall precautions;other (see comments)   MIHIR drain  -LR      Recorded by [LR] Liv Jolly, PT      Pain Assessment    Pain Assessment No/denies pain  -LR      Recorded by [LR] Liv Jolly, PT      Cognitive Assessment/Intervention    Current Cognitive/Communication Assessment functional  -LR      Orientation Status oriented x 4;required verbal cueing (specifiy in comments)  -LR      Follows Commands/Answers Questions 100% of the time;able to follow single-step instructions;needs cueing;needs repetition  -LR      Personal Safety WNL/WFL  -LR      Recorded by [LR] Liv Jolly, PT      Bed Mobility,  Assessment/Treatment    Bed Mob, Supine to Sit, [place] not tested   UIC on arrival.   -LR      Bed Mob, Sit to Supine, [place] not tested   UIC at end of treatment.   -LR      Recorded by [LR] Liv Jolly, PT      Transfer Assessment/Treatment    Transfers, Sit-Stand [place] verbal cues required;supervision required  -LR      Transfers, Stand-Sit [place] verbal cues required;supervision required  -LR      Transfers, Sit-Stand-Sit, Assist Device other (see comments)   no AD  -LR      Transfer, Comment Verbal cues for correct hand placement with t/f.   -LR      Recorded by [LR] Liv Jolly, PT      Gait Assessment/Treatment    Gait, [place] Level verbal cues required;contact guard assist  -LR      Gait, Assistive Device other (see comments)   no AD  -LR      Gait, Distance (Feet) 400  -LR      Gait, Gait Pattern Analysis swing-through gait  -LR      Gait, Impairments impaired balance  -LR      Gait, Comment Patient ambulated with step through gait pattern at slow pace with one instance of mild LOB but patient able to self correct. Gait limited by fatigue.   -LR      Recorded by [LR] Liv Jolly, PT      Stairs Assessment/Treatment    Number of Stairs 2  -LR      Stairs, Handrail Location left side (ascending)  -LR      Stairs, [place] Level verbal cues required;contact guard assist  -LR      Stairs, Technique Used step over step (ascending);step to step (descending)  -LR      Stairs, Safety Issues sequencing ability decreased  -LR      Stairs, Impairments strength decreased  -LR      Stairs, Comment Verbal cues to take one step at a time and to step up with stronger LE first and down with weaker LE first. Descended one step at a time and ascended reciprocally. No LOB or difficulty.  -LR      Recorded by [LR] Liv Jolly, PT      Therapy Exercises    Bilateral Lower Extremities --   deferre d/t fatigue.   -LR      Recorded by [LR] Liv Kidd  Rik, PT      Positioning and Restraints    Pre-Treatment Position sitting in chair/recliner  -LR      Post Treatment Position chair  -LR      In Chair notified nsg;sitting;call light within reach;encouraged to call for assist  -LR      Recorded by [LR] Liv Jolly, PT        User Key  (r) = Recorded By, (t) = Taken By, (c) = Cosigned By    Initials Name Effective Dates    LR Liv Jolly, PT 06/19/15 -                 IP PT Goals       03/04/18 0846 03/02/18 1543       Bed Mobility PT LTG    Bed Mobility PT LTG, Date Established 03/02/18  -LR 03/02/18  -LS     Bed Mobility PT LTG, Time to Achieve 2 wks  -LR 2 wks  -LS     Bed Mobility PT LTG, Activity Type supine to sit/sit to supine  -LR supine to sit/sit to supine  -LS     Bed Mobility PT LTG, [place] Level independent  -LR independent  -LS     Bed Mobility PT LTG, Date Goal Reviewed 03/04/18  -LR      Bed Mobility PT LTG, Outcome goal ongoing  -LR      Transfer Training PT LTG    Transfer Training PT LTG, Date Established 03/02/18  -LR 03/02/18  -LS     Transfer Training PT LTG, Time to Achieve 2 wks  -LR 2 wks  -LS     Transfer Training PT LTG, Activity Type sit to stand/stand to sit  -LR sit to stand/stand to sit  -LS     Transfer Training PT LTG, [place] Level independent  -LR independent  -LS     Transfer Training PT  LTG, Date Goal Reviewed 03/04/18  -LR      Transfer Training PT LTG, Outcome goal ongoing  -LR      Gait Training PT LTG    Gait Training Goal PT LTG, Date Established 03/02/18  -LR 03/02/18  -LS     Gait Training Goal PT LTG, Time to Achieve 2 wks  -LR 2 wks  -LS     Gait Training Goal PT LTG, [place] Level supervision required  -LR supervision required  -LS     Gait Training Goal PT LTG, Distance to Achieve 300 feet  -  -LS     Gait Training Goal PT LTG, Date Goal Reviewed 03/04/18  -LR      Gait Training Goal PT LTG, Outcome goal partially met   gait distance achieved but not at indicated level of  assist  -LR      Stair Training PT LTG    Stair Training Goal PT LTG, Date Established 03/02/18  -LR 03/02/18  -LS     Stair Training Goal PT LTG, Time to Achieve 2 wks  -LR 2 wks  -LS     Stair Training Goal PT LTG, Number of Steps 12  -LR 12  -LS     Stair Training Goal PT LTG, [place] Level supervision required  -LR supervision required  -LS     Stair Training Goal PT LTG, Assist Device 1 handrail  -LR 1 handrail  -LS     Stair Training Goal PT LTG, Date Goal Reviewed 03/04/18  -LR      Stair Training Goal PT LTG, Outcome goal ongoing  -LR        User Key  (r) = Recorded By, (t) = Taken By, (c) = Cosigned By    Initials Name Provider Type    LR Liv Jolly, PT Physical Therapist    LS Jackie Farmer, PT Physical Therapist          Physical Therapy Education     Title: PT OT SLP Therapies (Active)     Topic: Physical Therapy (Active)     Point: Mobility training (Active)    Learning Progress Summary    Learner Readiness Method Response Comment Documented by Status   Patient Acceptance ELULU DU Educated on correct stair training technique, correct car t/f technique, and correct log rolling technique.  03/04/18 1042 Done    Acceptance E,D NR   03/02/18 1543 Active   Significant Other Acceptance E,D NR   03/02/18 1543 Active               Point: Body mechanics (Active)    Learning Progress Summary    Learner Readiness Method Response Comment Documented by Status   Patient Acceptance LULU EASTMAN DU Educated on correct stair training technique, correct car t/f technique, and correct log rolling technique.  03/04/18 1042 Done    Acceptance E,D NR   03/02/18 1543 Active   Significant Other Acceptance E,D NR   03/02/18 1543 Active               Point: Precautions (Active)    Learning Progress Summary    Learner Readiness Method Response Comment Documented by Status   Patient Acceptance ELULU DU Educated on correct stair training technique, correct car t/f technique, and correct log rolling  technique.  03/04/18 1042 Done    Acceptance E,D NR  LS 03/02/18 1543 Active   Significant Other Acceptance E,D NR  LS 03/02/18 1543 Active                      User Key     Initials Effective Dates Name Provider Type Discipline    LR 06/19/15 -  Liv Jolly, PT Physical Therapist PT    LS 06/19/15 -  Jackie Farmer, PT Physical Therapist PT                    PT Recommendation and Plan  Anticipated Discharge Disposition: home with assist  PT Frequency: daily  Plan of Care Review  Plan Of Care Reviewed With: patient  Progress: progress toward functional goals as expected  Outcome Summary/Follow up Plan: Patient ambulated 400 feet without AD and one mild instance of unsteadiness which he correctec. Initiated stair training today with no difficulty. Will continue to progress as able. Possible d/c home in AM.           Outcome Measures       03/04/18 0846 03/02/18 1320       How much help from another person do you currently need...    Turning from your back to your side while in flat bed without using bedrails? 4  -LR 3  -LS     Moving from lying on back to sitting on the side of a flat bed without bedrails? 4  -LR 3  -LS     Moving to and from a bed to a chair (including a wheelchair)? 3  -LR 3  -LS     Standing up from a chair using your arms (e.g., wheelchair, bedside chair)? 3  -LR 3  -LS     Climbing 3-5 steps with a railing? 3  -LR 2  -LS     To walk in hospital room? 3  -LR 3  -LS     AM-PAC 6 Clicks Score 20  -LR 17  -LS     Functional Assessment    Outcome Measure Options AM-PAC 6 Clicks Basic Mobility (PT)  -LR AM-PAC 6 Clicks Basic Mobility (PT)  -LS       User Key  (r) = Recorded By, (t) = Taken By, (c) = Cosigned By    Initials Name Provider Type    LR Liv Jolly, PT Physical Therapist    LS Jackie Farmer, PT Physical Therapist           Time Calculation:         PT Charges       03/04/18 1042          Time Calculation    Start Time 0846  -LR      PT Received On 03/04/18  -LR      PT  Goal Re-Cert Due Date 03/12/18  -LR      Time Calculation- PT    Total Timed Code Minutes- PT 8 minute(s)  -LR        User Key  (r) = Recorded By, (t) = Taken By, (c) = Cosigned By    Initials Name Provider Type    LR Liv Jolly, PT Physical Therapist          Therapy Charges for Today     Code Description Service Date Service Provider Modifiers Qty    [id number] HC GAIT TRAINING EA 15 MIN 3/4/2018 Liv Jolly, PT GP 1          PT G-Codes  Outcome Measure Options: AM-PAC 6 Clicks Basic Mobility (PT)    Liv Jolly, PT  3/4/2018

## 2018-03-04 NOTE — PROGRESS NOTES
[facility] Medicine Services  PROGRESS NOTE    Patient Name: Alex Talbert  : 1955  MRN: [id number]    Date of Admission: 2018  Length of Stay: 8  Primary Care Physician: Dayo Richardson MD    Subjective   Subjective     CC:  F/u perf duodenal ulcer    HPI:  Wife at bedside.  No new issues overnight.  Is tolerating diet.  Abd pain is good.  Drain remains in place.    Review of Systems   Constitutional: Negative for fever.   Respiratory: Negative for shortness of breath.    Gastrointestinal: Positive for abdominal pain.   Neurological: Negative for weakness.   All other systems reviewed and are negative.    Otherwise ROS is negative except as mentioned in the HPI.    Objective   Objective     Vital Signs:   Temp:  [97.7 °F (36.5 °C)-99.4 °F (37.4 °C)] 97.7 °F (36.5 °C)  Heart Rate:  [65-78] 65  Resp:  [16-18] 18  BP: (130-156)/(80-89) 141/84        Physical Exam:  Constitutional: No acute distress, awake, alert  HENT: NCAT, mucous membranes moist  Respiratory: Clear to auscultation bilaterally, respiratory effort normal   Cardiovascular: RRR, no murmurs, rubs, or gallops  Gastrointestinal: Positive bowel sounds, soft, mild tenderness, drain in place with cloudy serous fluid, nondistended.  Incision is bandaged cleanly  Musculoskeletal: No bilateral ankle edema  Psychiatric: Appropriate affect, cooperative  Neurologic: Oriented x 3, no focal deficitsSkin: No rashes      Results Reviewed:  I have personally reviewed current lab, radiology, and data and agree.      Results from last 7 days  Lab Units 18  0319 18  1149 18  0505 18  0444   WBC 10*3/mm3 7.98  --  9.68 10.88*   HEMOGLOBIN g/dL 13.2  --  13.4 13.5   HEMATOCRIT % 39.5  --  39.3 39.9   PLATELETS 10*3/mm3 335  --  326 230   INR   --  1.07  --   --        Results from last 7 days  Lab Units 18  1909 18  0515 18  1759 18  0320 18  0505  18  0444   02/26/18  0401   SODIUM mmol/L  --  139  --  144 144  --  143  < > 136   POTASSIUM mmol/L 4.3 3.2* 3.9 3.3*  3.3* 3.1*  < > 3.5  < > 3.9   CHLORIDE mmol/L  --  107  --  111* 109  --  110*  < > 115*   CO2 mmol/L  --  29.0  --  27.0 26.0  --  24.0  < > 15.0*   BUN mg/dL  --  5*  --  <5* 6*  --  7*  < > 8*   CREATININE mg/dL  --  0.70  --  0.60 0.60  --  0.60  < > 0.60   GLUCOSE mg/dL  --  109*  --  143* 63*  --  148*  < > 177*   CALCIUM mg/dL  --  8.7  --  8.2* 8.2*  --  8.4*  < > 8.2*   ALT (SGPT) U/L  --   --   --   --   --   --  25  --  37   AST (SGOT) U/L  --   --   --   --   --   --  23  --  41*   TROPONIN I ng/mL  --   --   --   --   --   --   --   --  0.023   < > = values in this interval not displayed.  Estimated Creatinine Clearance: 131 mL/min (by C-G formula based on Cr of 0.7).  No results found for: BNP  No results found for: PHART    Microbiology Results Abnormal     Procedure Component Value - Date/Time    Blood Culture - Blood, Blood, Venous Line [[id number]]  (Normal) Collected:  02/26/18 0003    Lab Status:  Final result Specimen:  Blood from Blood, Arterial Line Updated:  03/03/18 0746     Blood Culture No growth at 5 days    Blood Culture - Blood, Blood, Venous Line [[id number]] Collected:  02/25/18 2049    Lab Status:  Final result Specimen:  Blood from Blood, Venous Line Updated:  03/02/18 2216     Blood Culture No growth at 5 days      Aerobic bottle only    Blood Culture - Blood, [[id number]]  (Normal) Collected:  02/24/18 1235    Lab Status:  Final result Specimen:  Blood from Wrist, Left Updated:  03/01/18 1301     Blood Culture No growth at 5 days    Blood Culture - Blood, [[id number]]  (Normal) Collected:  02/24/18 1230    Lab Status:  Final result Specimen:  Blood from Wrist, Right Updated:  03/01/18 1301     Blood Culture No growth at 5 days    Influenza A & B, RT PCR - Swab, Nasopharynx [[id number]]  (Abnormal) Collected:  02/24/18 1227    Lab Status:  Final result Specimen:  Swab from  Nasopharynx Updated:  02/24/18 1330     Influenza A PCR Not Detected     Influenza B PCR Detected (A)          Imaging Results (last 24 hours)     ** No results found for the last 24 hours. **        Results for orders placed during the hospital encounter of 02/24/18   Adult Transesophageal Echo ([facility]) W/ Cont if Necessary Per Protocol    Narrative · Left ventricular systolic function is normal. Estimated EF = 55%.  · Trace-to-mild mitral valve regurgitation is present  · No thrombus is seen in left atrium or left atrial appendage          I have reviewed the medications.    Assessment/Plan   Assessment / Plan     Hospital Problem List     * (Principal)Perforated duodenal ulcer    Overview Signed 2/26/2018 10:14 AM by Lazaro Esposito MD     Repair with patch 2/25/18 and cholecystectomy           Typical atrial flutter    Influenza B    DKA    Nausea & vomiting    Diarrhea    Type 2 diabetes mellitus    Overview Signed 2/24/2018  2:22 PM by MIRANDA Pena     Controlled with diet.                  Brief Hospital Course to date:  Alex Talbert is a 63 y.o. male admitted on February 24 with DKA into the ICU.  Subsequently developed abdominal pain and imaging showed free air with probable perforated duodenal ulcer.  When for emergent laparotomy with Dr. HASTINGS where duodenal ulcer repair was done along with a cholecystectomy.  Perioperatively also had some new a flutter.  Was cardioverted on March 2 and has maintained sinus rhythm since that time.  Hospitalists assumed care on March 3.      Assessment & Plan:  - continues to do well from surgical standpoint.  Tolerating diet.  Drain out when ok with surgery, anticipate tomorrow  - plan PPI bid x 1 month then daily forever  - s/p tamiflu for flu  - DKA resolved, a1c=14% on admission.  Seen by educator.  Seems responsive to lower insulin amounts.  Plan on at least basal coverage at discharge, though he seem savvy enough to give mealtime insulin as well.  -  s/p ECV, maintaining NSR.  On eliquis.  Possbily add BB soon if BP remains high.  - has not seen PCP in several years, previously saw Tabitha Alvarez.  Will need to re-establish after discharge.  - d/w wife and patient.    DVT Prophylaxis:  eliquis    CODE STATUS: Full Code    Disposition: I expect the patient to be discharged home Monday    Electronically signed by Royal Odom MD, 03/04/18, 2:40 PM.

## 2018-03-04 NOTE — PLAN OF CARE
Problem: Patient Care Overview (Adult)  Goal: Plan of Care Review  Outcome: Ongoing (interventions implemented as appropriate)   03/04/18 0846   Coping/Psychosocial Response Interventions   Plan Of Care Reviewed With patient   Patient Care Overview   Progress progress toward functional goals as expected   Outcome Evaluation   Outcome Summary/Follow up Plan Patient ambulated 400 feet without AD and one mild instance of unsteadiness which he correctec. Initiated stair training today with no difficulty. Will continue to progress as able. Possible d/c home in AM.        Problem: Inpatient Physical Therapy  Goal: Bed Mobility Goal LTG- PT  Outcome: Ongoing (interventions implemented as appropriate)   03/04/18 0846   Bed Mobility PT LTG   Bed Mobility PT LTG, Date Established 03/02/18   Bed Mobility PT LTG, Time to Achieve 2 wks   Bed Mobility PT LTG, Activity Type supine to sit/sit to supine   Bed Mobility PT LTG, [place] Level independent   Bed Mobility PT LTG, Date Goal Reviewed 03/04/18   Bed Mobility PT LTG, Outcome goal ongoing     Goal: Transfer Training Goal 1 LTG- PT  Outcome: Ongoing (interventions implemented as appropriate)   03/04/18 0846   Transfer Training PT LTG   Transfer Training PT LTG, Date Established 03/02/18   Transfer Training PT LTG, Time to Achieve 2 wks   Transfer Training PT LTG, Activity Type sit to stand/stand to sit   Transfer Training PT LTG, [place] Level independent   Transfer Training PT LTG, Date Goal Reviewed 03/04/18   Transfer Training PT LTG, Outcome goal ongoing     Goal: Gait Training Goal LTG- PT  Outcome: Ongoing (interventions implemented as appropriate)   03/04/18 0846   Gait Training PT LTG   Gait Training Goal PT LTG, Date Established 03/02/18   Gait Training Goal PT LTG, Time to Achieve 2 wks   Gait Training Goal PT LTG, [place] Level supervision required   Gait Training Goal PT LTG, Distance to Achieve 300 feet   Gait Training Goal PT LTG, Date Goal Reviewed  03/04/18   Gait Training Goal PT LTG, Outcome goal partially met  (gait distance achieved but not at indicated level of assist)     Goal: Stair Training Goal LTG- PT  Outcome: Ongoing (interventions implemented as appropriate)   03/04/18 0846   Stair Training PT LTG   Stair Training Goal PT LTG, Date Established 03/02/18   Stair Training Goal PT LTG, Time to Achieve 2 wks   Stair Training Goal PT LTG, Number of Steps 12   Stair Training Goal PT LTG, [place] Level supervision required   Stair Training Goal PT LTG, Assist Device 1 handrail   Stair Training Goal PT LTG, Date Goal Reviewed 03/04/18   Stair Training Goal PT LTG, Outcome goal ongoing

## 2018-03-04 NOTE — PLAN OF CARE
Problem: Patient Care Overview (Adult)  Goal: Plan of Care Review  Outcome: Ongoing (interventions implemented as appropriate)   03/04/18 0846 03/04/18 1644   Coping/Psychosocial Response Interventions   Plan Of Care Reviewed With --  patient;spouse   Patient Care Overview   Progress progress toward functional goals as expected --    Outcome Evaluation   Outcome Summary/Follow up Plan --  Patient ambulating in room without assistance and reports soreness has improved. Provided instructions on dressing change to patient and wife at this time. VSS.     Goal: Adult Individualization and Mutuality  Outcome: Ongoing (interventions implemented as appropriate)   03/04/18 1644   Individualization   Patient Specific Goals Patient would like to have MIHIR drain out to improve ADL's.     Goal: Discharge Needs Assessment  Outcome: Ongoing (interventions implemented as appropriate)   02/26/18 1641 03/02/18 1320   Discharge Needs Assessment   Readmission Within The Last 30 Days no previous admission in last 30 days --    Discharge Disposition still a patient --    Current Health   Anticipated Changes Related to Illness none --    Self-Care   Equipment Currently Used at Home --  other (see comments)  (wears orthotic shoes)   Living Environment   Transportation Available car;family or friend will provide --

## 2018-03-05 VITALS
WEIGHT: 180.9 LBS | DIASTOLIC BLOOD PRESSURE: 87 MMHG | RESPIRATION RATE: 18 BRPM | OXYGEN SATURATION: 94 % | SYSTOLIC BLOOD PRESSURE: 143 MMHG | HEART RATE: 67 BPM | TEMPERATURE: 98.7 F | BODY MASS INDEX: 24.5 KG/M2 | HEIGHT: 72 IN

## 2018-03-05 PROBLEM — R19.7 DIARRHEA: Status: RESOLVED | Noted: 2018-02-24 | Resolved: 2018-03-05

## 2018-03-05 PROBLEM — E11.10 DIABETIC KETOACIDOSIS WITHOUT COMA (HCC): Status: RESOLVED | Noted: 2018-02-24 | Resolved: 2018-03-05

## 2018-03-05 PROBLEM — J10.1 INFLUENZA B: Status: RESOLVED | Noted: 2018-02-24 | Resolved: 2018-03-05

## 2018-03-05 PROBLEM — R11.2 NAUSEA & VOMITING: Status: RESOLVED | Noted: 2018-02-24 | Resolved: 2018-03-05

## 2018-03-05 LAB — GLUCOSE BLDC GLUCOMTR-MCNC: 105 MG/DL (ref 70–130)

## 2018-03-05 PROCEDURE — 99239 HOSP IP/OBS DSCHRG MGMT >30: CPT | Performed by: NURSE PRACTITIONER

## 2018-03-05 PROCEDURE — 97530 THERAPEUTIC ACTIVITIES: CPT

## 2018-03-05 PROCEDURE — 82962 GLUCOSE BLOOD TEST: CPT

## 2018-03-05 PROCEDURE — 94799 UNLISTED PULMONARY SVC/PX: CPT

## 2018-03-05 PROCEDURE — 99232 SBSQ HOSP IP/OBS MODERATE 35: CPT | Performed by: INTERNAL MEDICINE

## 2018-03-05 RX ORDER — PANTOPRAZOLE SODIUM 40 MG/1
40 TABLET, DELAYED RELEASE ORAL
Qty: 60 TABLET | Refills: 1 | Status: SHIPPED | OUTPATIENT
Start: 2018-03-05 | End: 2019-07-12

## 2018-03-05 RX ADMIN — APIXABAN 5 MG: 5 TABLET, FILM COATED ORAL at 08:00

## 2018-03-05 RX ADMIN — PANTOPRAZOLE SODIUM 40 MG: 40 TABLET, DELAYED RELEASE ORAL at 07:58

## 2018-03-05 NOTE — PROGRESS NOTES
Continued Stay Note   [facility]     Patient Name: Alex Talbert  MRN: [id number]  Today's Date: 3/5/2018    Admit Date: 2/24/2018          Discharge Plan       03/05/18 1047    Case Management/Social Work Plan    Plan Home    Patient/Family In Agreement With Plan yes    Additional Comments Met with patient and his wife in the room to update the discharge plan. Patient has discharge orders. He denies any needs, and his wife states they will be able to do the wet-to-dry dressing changes at home. CM will follow.               Discharge Codes       03/05/18 1048    Discharge Codes    Discharge Codes 01  Discharge to home        Expected Discharge Date and Time     Expected Discharge Date Expected Discharge Time    Mar 5, 2018             Symone Vivar

## 2018-03-05 NOTE — DISCHARGE SUMMARY
[facility] Medicine Services  DISCHARGE SUMMARY    Patient Name: Alex Talbert  : 1955  MRN: [id number]    Date of Admission: 2018  Date of Discharge: 3.5.2018  Primary Care Physician: Dayo Richardson MD    Consults     Date and Time Order Name Status Description    2018 0936 Inpatient Consult to Cardiology Completed         Hospital Course     Presenting Problem:   Influenza B [J10.1]    Active Hospital Problems (** Indicates Principal Problem)    Diagnosis Date Noted   • **Perforated duodenal ulcer [K26.5] 2018   • Typical atrial flutter [I48.3] 2018   • Type 2 diabetes mellitus [E11.9] 2018      Resolved Hospital Problems    Diagnosis Date Noted Date Resolved   • Influenza B [J10.1] 2018   • DKA [E13.10] 2018   • Nausea & vomiting [R11.2] 2018   • Diarrhea [R19.7] 2018          Hospital Course:  Alex Talbert is a 63 y.o. male admitted on  with DKA into the ICU.  A1C=14%.  Subsequently developed abdominal pain and imaging showed free air with probable perforated duodenal ulcer.  Went for emergent laparotomy 18 with Dr. HASTINGS where duodenal ulcer repair was done along with a cholecystectomy.  Perioperatively also had some new a flutter.  Was cardioverted on  and has maintained sinus rhythm since that time.  He was started on eliquis, CHADS2 Vasc=1.  Hospitalists assumed care on March 3.  He has otherwise remained stable, his MIHIR drain has been removed today and he is not requiring pain medications.  He is felt to be stable and ready for dc home.  He and his wife are both comfortable with dressing changes and insulin administration.          Procedure(s):  LAPAROTOMY EXPLORATORY    111 Electrical Cardioversion   Day of Discharge     HPI:   Hospital follow up for DKA/perf ulcer repair    Review of Systems  Gen- No fevers, chills  CV- No chest pain,  palpitations  Resp- No cough, dyspnea  GI- No N/V/D, abd pain      Otherwise ROS is negative except as mentioned in the HPI.    Vital Signs:   Temp:  [97.7 °F (36.5 °C)-99.2 °F (37.3 °C)] 98.7 °F (37.1 °C)  Heart Rate:  [67-70] 67  Resp:  [16-18] 18  BP: (123-143)/(82-88) 143/87     Physical Exam:  Constitutional: No acute distress, awake, alert, wife at bedside  HENT: NCAT, mucous membranes moist  Respiratory: Clear to auscultation bilaterally, respiratory effort normal   Cardiovascular: RRR, no murmurs, rubs, or gallops, palpable pedal pulses bilaterally  Gastrointestinal: Positive bowel sounds, soft, nontender, nondistended, midline dressing c/d/i  Musculoskeletal: No bilateral ankle edema  Psychiatric: Appropriate affect, cooperative  Neurologic: Oriented x 3, strength symmetric in all extremities, speech clear  Skin: No rashes      Pertinent  and/or Most Recent Results       Results from last 7 days  Lab Units 03/03/18  1909 03/03/18  0515 03/02/18  1759 03/02/18  0320 03/02/18  0319 03/01/18  0505 02/28/18  2318 02/28/18  0444 02/27/18  0353   WBC 10*3/mm3  --   --   --   --  7.98 9.68  --  10.88* 10.91*   HEMOGLOBIN g/dL  --   --   --   --  13.2 13.4  --  13.5 14.3   HEMATOCRIT %  --   --   --   --  39.5 39.3  --  39.9 42.6   PLATELETS 10*3/mm3  --   --   --   --  335 326  --  230 194   SODIUM mmol/L  --  139  --  144  --  144  --  143 142   POTASSIUM mmol/L 4.3 3.2* 3.9 3.3*  3.3*  --  3.1* 3.0* 3.5 4.3   CHLORIDE mmol/L  --  107  --  111*  --  109  --  110* 113*   CO2 mmol/L  --  29.0  --  27.0  --  26.0  --  24.0 19.0*   BUN mg/dL  --  5*  --  <5*  --  6*  --  7* 7*   CREATININE mg/dL  --  0.70  --  0.60  --  0.60  --  0.60 0.60   GLUCOSE mg/dL  --  109*  --  143*  --  63*  --  148* 179*   CALCIUM mg/dL  --  8.7  --  8.2*  --  8.2*  --  8.4* 8.4*       Results from last 7 days  Lab Units 03/02/18  1201 03/02/18  0320 03/01/18  1629 03/01/18  1149 02/28/18  0444   BILIRUBIN mg/dL  --   --   --   --  1.0    ALK PHOS U/L  --   --   --   --  61   ALT (SGPT) U/L  --   --   --   --  25   AST (SGOT) U/L  --   --   --   --  23   PROTIME Seconds  --   --   --  11.2  --    INR   --   --   --  1.07  --    APTT seconds 49.9* 38.9* 29.2* 47.1*  --          Brief Urine Lab Results  (Last result in the past 365 days)      Color   Clarity   Blood   Leuk Est   Nitrite   Protein   CREAT   Urine HCG        02/24/18 1335 Yellow Clear Small (1+)(A) Negative Negative 100 mg/dL (2+)(A)               Microbiology Results Abnormal     Procedure Component Value - Date/Time    Blood Culture - Blood, Blood, Venous Line [[id number]]  (Normal) Collected:  02/26/18 0003    Lab Status:  Final result Specimen:  Blood from Blood, Arterial Line Updated:  03/03/18 0746     Blood Culture No growth at 5 days    Blood Culture - Blood, Blood, Venous Line [[id number]] Collected:  02/25/18 2049    Lab Status:  Final result Specimen:  Blood from Blood, Venous Line Updated:  03/02/18 2216     Blood Culture No growth at 5 days      Aerobic bottle only    Blood Culture - Blood, [[id number]]  (Normal) Collected:  02/24/18 1235    Lab Status:  Final result Specimen:  Blood from Wrist, Left Updated:  03/01/18 1301     Blood Culture No growth at 5 days    Blood Culture - Blood, [[id number]]  (Normal) Collected:  02/24/18 1230    Lab Status:  Final result Specimen:  Blood from Wrist, Right Updated:  03/01/18 1301     Blood Culture No growth at 5 days    Influenza A & B, RT PCR - Swab, Nasopharynx [[id number]]  (Abnormal) Collected:  02/24/18 1227    Lab Status:  Final result Specimen:  Swab from Nasopharynx Updated:  02/24/18 1330     Influenza A PCR Not Detected     Influenza B PCR Detected (A)          Imaging Results (all)     Procedure Component Value Units Date/Time    XR Chest 1 View [[id number]] Collected:  02/24/18 1339     Updated:  02/24/18 1456    Narrative:       EXAMINATION: XR CHEST, SINGLE VIEW - 02/24/2018     INDICATION: Shortness of air.     COMPARISON:  None.     FINDINGS: Cardiac size is borderline enlarged. Bony vascularity within  normal limits. No focal opacification or consolidation with background  chronic lung changes including bibasilar atelectasis and/or scarring. No  pneumothorax or pleural effusion. Degenerative changes of the thoracic  spine.           Impression:       Chronic lung changes without acute cardiopulmonary process.     DICTATED:     02/24/2018  EDITED    :     02/24/2018      This report was finalized on 2/24/2018 2:54 PM by Dr. Jean Marie Amaya.       CT Abdomen Pelvis Without Contrast [[id number]] Collected:  02/25/18 1920     Updated:  02/25/18 2012    Narrative:       EXAM:    CT Abdomen and Pelvis Without Intravenous Contrast    CLINICAL HISTORY:    62 years old, male; Other specified diabetes mellitus with ketoacidosis   without coma; Acidosis; Influenza due to other identified influenza virus with   other respiratory manifestations; Acute kidney failure, unspecified; Pain;   Abdominal pain; Flank; Right; Additional info: Abdominal pain, unspecified    TECHNIQUE:    Axial computed tomography images of the abdomen and pelvis without   intravenous contrast.  All CT scans at this facility use one or more dose   reduction techniques, viz.: automated exposure control; ma/kV adjustment per   patient size (including targeted exams where dose is matched to indication;   i.e. head); or iterative reconstruction technique.    Coronal reformatted images were created and reviewed.    COMPARISON:    No relevant prior studies available.    FINDINGS:    Lower thorax:  Trace bilateral pleural effusions.  Dependent atelectasis.     ABDOMEN:    Liver:  Unremarkable.    Gallbladder and bile ducts:  Gallstones and biliary sludge in the   gallbladder.  No biliary dilatation.    Pancreas:  Unremarkable.  No ductal dilation.    Spleen:  Unremarkable.  No splenomegaly.    Adrenals:  Unremarkable.  No mass.    Kidneys and ureters:  Nonobstructing 2 mm left renal  calculus.    Stomach and bowel: Punctate bubbles of free air adjacent to the proximal   duodenum which demonstrates wall thickening and adjacent inflammatory changes,   consider duodenal perforation related to ulcer or mass.  No obstruction.    Appendix:  No findings to suggest acute appendicitis.     PELVIS:    Bladder:  Unremarkable.  No stones.    Reproductive:  Unremarkable as visualized.     ABDOMEN and PELVIS:    Intraperitoneal space:  Small volume free intraperitoneal air.  Trace   perihepatic ascites.  No loculated abscess.      Bones/joints:  No acute fracture.  No dislocation.    Soft tissues:  Unremarkable.    Vasculature:  Unremarkable.  No abdominal aortic aneurysm.    Lymph nodes:  Unremarkable.  No enlarged lymph nodes.      Impression:       1.  Small volume free intraperitoneal air.  Proximal duodenal wall thickening   and adjacent inflammatory changes, consider duodenal perforation related to   ulcer or mass.  No obstruction.  2.  Trace perihepatic ascites.  No loculated abscess.  3.  Trace bilateral pleural effusions.  4.  Incidental findings above.      THIS REPORT CONTAINS FINDINGS THAT MAY BE CRITICAL TO PATIENT CARE. The   findings were verbally communicated via telephone conference with Dr. Romero   at 8:06 PM EST on 2/25/2018. The findings were acknowledged and understood.    THIS DOCUMENT HAS BEEN ELECTRONICALLY SIGNED BY SKYLER SELF MD    XR Chest 1 View [[id number]] Collected:  02/27/18 1050     Updated:  02/27/18 1712    Narrative:       EXAMINATION: XR CHEST 1 VW-02/27/2018:      INDICATION: Hypoxia; J10.1-Influenza due to other identified influenza  virus with other respiratory manifestations; E13.10-Other specified  diabetes mellitus with ketoacidosis without coma; N17.9-Acute kidney  failure, unspecified; E87.2-Acidosis; K81.9-Cholecystitis, unspecified;  I48.3-Typical atrial flutter.      COMPARISON: 02/24/2018.     FINDINGS: The cardiac silhouette is normal. There is mild  central  vascular congestion. There is no consolidation, mass or effusion. There  is mild central vascular congestion, more prominent than on the previous  examination of 02/24/2018 and somewhat related to a poor inspiratory  effort. There continues to be extraluminal air beneath the right  diaphragm which was reported on the CT scan of 02/25/2018.        Impression:       Increasing vascular congestion, largely related to a poor  inspiratory effort. Lastly, there is once again extraluminal air beneath  the right diaphragm which was also reported on the CT scan of the  abdomen and pelvis dated 02/25/2018.     D:  02/27/2018  E:  02/27/2018     This report was finalized on 2/27/2018 5:10 PM by Dr. Sandoval Nixon MD.       FL Upper GI Single Contrast SBFT [[id number]] Collected:  02/28/18 1627     Updated:  02/28/18 1659    Narrative:       EXAMINATION: FL UPPER GI SINGLE CONTRAST SBFT-     INDICATION: Repair of perforated duodenal ulcer; J10.1-Influenza due to  other identified influenza virus with other respiratory manifestations;  E13.10-Other specified diabetes mellitus with ketoacidosis without coma;  N17.9-Acute kidney failure, unspecified; E87.2-Acidosis;  K81.9-Cholecystitis, unspecified; I48.3-Typical atrial flutter     TECHNIQUE: 2 minutes and 40 seconds of fluoroscopic time was used for  this exam. 16 associated images were saved.  imaging reveals a  nonobstructive bowel gas pattern. There is an NG tube visible in the  left upper quadrant. There is a surgical drain visible in the right  upper quadrant. There are surgical clips visible in the right upper  quadrant.     COMPARISON: NONE     FINDINGS: Upper GI series: Under fluoroscopic observation, the patient  ingested water-soluble contrast. The oral phase of deglutition appeared  normal. The esophageal mucosa appeared grossly normal. There was no  evidence of a focal esophageal stricture. Examination of the stomach  demonstrated grossly normal gastric  mucosa and gastric folds. There was  no delay in gastric emptying. There is mild mucosal irregularity, and  luminal narrowing of the distal duodenal bulb, and first portion of the  duodenum, which likely represents postoperative changes, and/or edema  due to duodenal ulcer repair. No extravasation of contrast was seen. The  second, third, fourth portion of the duodenum appeared grossly normal.     Small bowel follow-through: A single contrast study using water-soluble  contrast was performed. Images of the small bowel were obtained at 30  minutes, 1 hour, and 2 hours and 25 minutes. 30 minute film shows  contrast opacification of grossly normal-appearing stomach, and proximal  small bowel. 1 hour film shows contrast opacification of grossly  normal-appearing proximal, mid, and distal small bowel. Contrast is seen  within the colon at 2 hours and 25 minutes. There was no dilatation or  other evidence of obstruction. There was no fold thickening, filling  defect, or mucosal irregularity. There was normal peristalsis throughout  small bowel.          Impression:       Upper GI series: Status post duodenal ulcer repair. There is  mild mucosal irregularity, and luminal narrowing of the distal duodenal  bulb, and the first portion of the duodenum, which likely represents  postoperative changes, and/or edema. There was no evidence of  extraluminal contrast. There was no delay in gastric emptying.     Small bowel follow-through: Small bowel series appeared within normal  limits.         This report was finalized on 2/28/2018 4:57 PM by Dr. Chip Brownlee MD.                       Results for orders placed during the hospital encounter of 02/24/18   Adult Transesophageal Echo ([facility]) W/ Cont if Necessary Per Protocol    Narrative · Left ventricular systolic function is normal. Estimated EF = 55%.  · Trace-to-mild mitral valve regurgitation is present  · No thrombus is seen in left atrium or left atrial appendage             Discharge Details      Alex Talbert   Home Medication Instructions QASIM:[id number]    Printed on:03/05/18 1032   Medication Information                      apixaban (ELIQUIS) 5 MG tablet tablet  Take 1 tablet by mouth Every 12 (Twelve) Hours.             insulin detemir (LEVEMIR) 100 UNIT/ML injection  Inject 10 Units under the skin Every Night.             insulin lispro (humaLOG) 100 UNIT/ML injection  Inject 5 Units under the skin 3 (Three) Times a Day With Meals.             Multiple Vitamins-Minerals (MULTIVITAMIN MEN 50+) tablet  Take 1 tablet by mouth Daily.             pantoprazole (PROTONIX) 40 MG EC tablet  Take 1 tablet by mouth 2 (Two) Times a Day Before Meals. BID for 1 month, then decrease to once daily                   Discharge Disposition:  Home or Self Care    Discharge Diet:  Diet Instructions     Diet: Regular, Consistent Carbohydrate; Thin       Discharge Diet:   Regular  Consistent Carbohydrate      Fluid Consistency:  Thin                 Discharge Activity:   Activity Instructions     Activity as Tolerated                     Special Instructions:  Daily wet to dry dressing changes with saline.      No future appointments.    Additional Instructions for the Follow-ups that You Need to Schedule     Discharge Follow-up with PCP    As directed    Follow Up Details:  1 week           Discharge Follow-up with Specialty: Nneka Alvarez; 2 Weeks    As directed    Specialty:  Nneka Alvarez    Follow Up:  2 Weeks    Follow Up Details:  has seen in past for T2DM           Discharge Follow-up with Specified Provider: VENKATA; 1 Week    As directed    To:  VENKATA    Follow Up:  1 Week           Discharge Follow-up with Specified Provider: Jennifer    As directed    To:  Jennifer    Follow Up Details:  per his recs                     Time Spent on Discharge:  40 minutes    Electronically signed by MIRANDA Madrid, 03/05/18, 10:32 AM.

## 2018-03-05 NOTE — THERAPY DISCHARGE NOTE
Acute Care - Physical Therapy Treatment Note/Discharge  [facility]     Patient Name: Alex Talbert  : 1955  MRN: [id number]  Today's Date: 3/5/2018  Onset of Illness/Injury or Date of Surgery Date: 18  Date of Referral to PT: 18  Referring Physician: MD Tia    Admit Date: 2018    Visit Dx:    ICD-10-CM ICD-9-CM   1. Influenza B J10.1 487.1   2. Diabetic ketoacidosis without coma associated with other specified diabetes mellitus E13.10 250.12   3. Acute renal failure, unspecified acute renal failure type N17.9 584.9   4. Metabolic acidosis E87.2 276.2   5. Cholecystitis K81.9 575.10   6. Typical atrial flutter I48.3 427.32   7. Impaired functional mobility, balance, gait, and endurance Z74.09 V49.89     Patient Active Problem List   Diagnosis   • Type 2 diabetes mellitus   • Typical atrial flutter   • Perforated duodenal ulcer       Physical Therapy Education     Title: PT OT SLP Therapies (Active)     Topic: Physical Therapy (Active)     Point: Mobility training (Active)    Learning Progress Summary    Learner Readiness Method Response Comment Documented by Status   Patient EagANGEL Alberto reviewed benefits of activity and no lifting precautions SC 18 1311 Done    Acceptance E,ANGEL CARTER Educated on correct stair training technique, correct car t/f technique, and correct log rolling technique.  18 1042 Done    Acceptance E,D NR   18 1543 Active   Significant Other Acceptance E,D NR   18 1543 Active               Point: Home exercise program (Done)    Learning Progress Summary    Learner Readiness Method Response Comment Documented by Status   Patient EagANGEL Alberto reviewed benefits of activity and no lifting precautions SC 18 1311 Done               Point: Body mechanics (Active)    Learning Progress Summary    Learner Readiness Method Response Comment Documented by Status   Patient ANGEL Tony reviewed benefits of activity and no lifting  precautions SC 03/05/18 1311 Done    Acceptance E,D ANGEL ELLER Educated on correct stair training technique, correct car t/f technique, and correct log rolling technique. LR 03/04/18 1042 Done    Acceptance E,D NR  LS 03/02/18 1543 Active   Significant Other Acceptance E,D NR  LS 03/02/18 1543 Active               Point: Precautions (Active)    Learning Progress Summary    Learner Readiness Method Response Comment Documented by Status   Patient Eager E ANGEL ELLER reviewed benefits of activity and no lifting precautions SC 03/05/18 1311 Done    Acceptance E,D ANGEL ELLER Educated on correct stair training technique, correct car t/f technique, and correct log rolling technique.  03/04/18 1042 Done    Acceptance E,D NR  LS 03/02/18 1543 Active   Significant Other Acceptance E,D NR   03/02/18 1543 Active                      User Key     Initials Effective Dates Name Provider Type Discipline    SC 06/19/15 -  Kelsy Kenney, PT Physical Therapist PT    LR 06/19/15 -  Liv Jolly, PT Physical Therapist PT     06/19/15 -  Jackie Farmer, PT Physical Therapist PT                    IP PT Goals       03/05/18 1313 03/04/18 0846 03/02/18 1543    Bed Mobility PT LTG    Bed Mobility PT LTG, Date Established  03/02/18  -LR 03/02/18  -LS    Bed Mobility PT LTG, Time to Achieve  2 wks  -LR 2 wks  -LS    Bed Mobility PT LTG, Activity Type  supine to sit/sit to supine  -LR supine to sit/sit to supine  -LS    Bed Mobility PT LTG, [place] Level  independent  -LR independent  -LS    Bed Mobility PT LTG, Date Goal Reviewed  03/04/18  -LR     Bed Mobility PT LTG, Outcome goal met  -SC goal ongoing  -LR     Transfer Training PT LTG    Transfer Training PT LTG, Date Established  03/02/18  -LR 03/02/18  -LS    Transfer Training PT LTG, Time to Achieve  2 wks  -LR 2 wks  -LS    Transfer Training PT LTG, Activity Type  sit to stand/stand to sit  -LR sit to stand/stand to sit  -LS    Transfer Training PT LTG, [place] Level   independent  -LR independent  -LS    Transfer Training PT  LTG, Date Goal Reviewed  03/04/18  -LR     Transfer Training PT LTG, Outcome goal met  -SC goal ongoing  -LR     Gait Training PT LTG    Gait Training Goal PT LTG, Date Established  03/02/18  -LR 03/02/18  -LS    Gait Training Goal PT LTG, Time to Achieve  2 wks  -LR 2 wks  -LS    Gait Training Goal PT LTG, [place] Level  supervision required  -LR supervision required  -LS    Gait Training Goal PT LTG, Distance to Achieve  300 feet  -  -LS    Gait Training Goal PT LTG, Date Goal Reviewed  03/04/18  -LR     Gait Training Goal PT LTG, Outcome goal met  -SC goal partially met   gait distance achieved but not at indicated level of assist  -LR     Stair Training PT LTG    Stair Training Goal PT LTG, Date Established  03/02/18  -LR 03/02/18  -LS    Stair Training Goal PT LTG, Time to Achieve  2 wks  -LR 2 wks  -LS    Stair Training Goal PT LTG, Number of Steps  12  -LR 12  -LS    Stair Training Goal PT LTG, [place] Level  supervision required  -LR supervision required  -LS    Stair Training Goal PT LTG, Assist Device  1 handrail  -LR 1 handrail  -LS    Stair Training Goal PT LTG, Date Goal Reviewed  03/04/18  -LR     Stair Training Goal PT LTG, Outcome goal partially met  -SC goal ongoing  -LR       User Key  (r) = Recorded By, (t) = Taken By, (c) = Cosigned By    Initials Name Provider Type    SC Kelsy Kenney, PT Physical Therapist    LR Liv Jolly, PT Physical Therapist    LS Jackie Farmer, PT Physical Therapist              Adult Rehabilitation Note       03/05/18 0952 03/04/18 0846       Rehab Assessment/Intervention    Discipline physical therapist  -SC physical therapist  -LR     Document Type therapy note (daily note);discharge summary  -SC therapy note (daily note)  -LR     Subjective Information agree to therapy;no complaints  -SC agree to therapy;no complaints  -LR     Patient Effort, Rehab Treatment excellent  -SC excellent   -LR     Symptoms Noted During/After Treatment none  -SC none  -LR     Precautions/Limitations  fall precautions;other (see comments)   MIHIR drain  -LR     Recorded by [SC] Kelsy Kenney, PT [LR] Liv Jolly, PT     Vital Signs    Post Systolic BP Rehab 133  -SC      Post Treatment Diastolic BP 88  -SC      Posttreatment Heart Rate (beats/min) 108  -SC      Recorded by [SC] Kelsy Kenney, PT      Pain Assessment    Pain Assessment No/denies pain  -SC No/denies pain  -LR     Recorded by [SC] Kelsy Kenney, PT [LR] Liv Jolly PT     Cognitive Assessment/Intervention    Current Cognitive/Communication Assessment functional  -SC functional  -LR     Orientation Status oriented x 4  -SC oriented x 4;required verbal cueing (specifiy in comments)  -LR     Follows Commands/Answers Questions 100% of the time  -% of the time;able to follow single-step instructions;needs cueing;needs repetition  -LR     Personal Safety WNL/WFL  -SC WNL/WFL  -LR     Recorded by [SC] Kelsy Kenney, PT [LR] Liv Jolly, PT     Bed Mobility, Assessment/Treatment    Bed Mob, Supine to Sit, [place] independent  -SC not tested   UIC on arrival.   -LR     Bed Mob, Sit to Supine, Foard independent  -SC not tested   UIC at end of treatment.   -LR     Recorded by [SC] Kelsy Kenney, PT [LR] Liv Jolly, SANJANA     Transfer Assessment/Treatment    Transfers, Sit-Stand [place] independent  -SC verbal cues required;supervision required  -LR     Transfers, Stand-Sit [place] independent  -SC verbal cues required;supervision required  -LR     Transfers, Sit-Stand-Sit, Assist Device  other (see comments)   no AD  -LR     Transfer, Comment  Verbal cues for correct hand placement with t/f.   -LR     Recorded by [SC] Kelsy Kenney, PT [LR] Liv Jolly PT     Gait Assessment/Treatment    Gait, [place] Level independent  -SC verbal cues required;contact guard assist  -LR     Gait,  Assistive Device  other (see comments)   no AD  -LR     Gait, Distance (Feet) 500  -  -LR     Gait, Gait Pattern Analysis swing-through gait  -SC swing-through gait  -LR     Gait, Impairments  impaired balance  -     Gait, Comment good balance with all turns  -SC Patient ambulated with step through gait pattern at slow pace with one instance of mild LOB but patient able to self correct. Gait limited by fatigue.   -LR     Recorded by [SC] Kelsy Kenney, PT [LR] Liv Jolly PT     Stairs Assessment/Treatment    Number of Stairs  2  -LR     Stairs, Handrail Location  left side (ascending)  -LR     Stairs, [place] Level  verbal cues required;contact guard assist  -LR     Stairs, Technique Used  step over step (ascending);step to step (descending)  -LR     Stairs, Safety Issues  sequencing ability decreased  -LR     Stairs, Impairments  strength decreased  -LR     Stairs, Comment unavailable  -SC Verbal cues to take one step at a time and to step up with stronger LE first and down with weaker LE first. Descended one step at a time and ascended reciprocally. No LOB or difficulty.  -LR     Recorded by [SC] Kelsy Kenney, PT [LR] Liv Jolly, SANJANA     Therapy Exercises    Bilateral Lower Extremities heel slides   slow marches  -SC --   deferre d/t fatigue.   -LR     Trunk Exercises --   abdominal sets in standing  -SC      Recorded by [SC] Kelsy eKnney, PT [LR] Liv Jolly PT     Positioning and Restraints    Pre-Treatment Position in bed  -SC sitting in chair/recliner  -LR     Post Treatment Position bed  -SC chair  -LR     In Chair  notified nsg;sitting;call light within reach;encouraged to call for assist  -LR     Recorded by [SC] Kelsy Kenney, PT [LR] Liv Jolly, PT       User Key  (r) = Recorded By, (t) = Taken By, (c) = Cosigned By    Initials Name Effective Dates    SC Kelsy Kenney, PT 06/19/15 -     LR Liv Jolly, SANJANA 06/19/15 -           PT  Recommendation and Plan  Anticipated Discharge Disposition: home with assist  PT Frequency: daily  Plan of Care Review  Progress: progress toward functional goals as expected  Outcome Summary/Follow up Plan: Mobilizinf independently. Going home with family.          Outcome Measures       03/05/18 0952 03/04/18 0846 03/02/18 1320    How much help from another person do you currently need...    Turning from your back to your side while in flat bed without using bedrails? 4  -SC 4  -LR 3  -LS    Moving from lying on back to sitting on the side of a flat bed without bedrails? 4  -SC 4  -LR 3  -LS    Moving to and from a bed to a chair (including a wheelchair)? 4  -SC 3  -LR 3  -LS    Standing up from a chair using your arms (e.g., wheelchair, bedside chair)? 4  -SC 3  -LR 3  -LS    Climbing 3-5 steps with a railing? 4  -SC 3  -LR 2  -LS    To walk in hospital room? 4  -SC 3  -LR 3  -LS    AM-PAC 6 Clicks Score 24  -SC 20  -LR 17  -LS    Functional Assessment    Outcome Measure Options AM-PAC 6 Clicks Basic Mobility (PT)  -SC AM-PAC 6 Clicks Basic Mobility (PT)  -LR AM-PAC 6 Clicks Basic Mobility (PT)  -LS      User Key  (r) = Recorded By, (t) = Taken By, (c) = Cosigned By    Initials Name Provider Type    SC Kelsy Kenney, PT Physical Therapist    LR Liv Jolly, PT Physical Therapist    LS Jackie Farmer, PT Physical Therapist           Time Calculation:         PT Charges       03/05/18 1315          Time Calculation    Start Time 0952  -SC      PT Received On 03/05/18  -SC      Time Calculation- PT    Total Timed Code Minutes- PT 11 minute(s)  -SC        User Key  (r) = Recorded By, (t) = Taken By, (c) = Cosigned By    Initials Name Provider Type    SC Kelsy Kenney, PT Physical Therapist          Therapy Charges for Today     Code Description Service Date Service Provider Modifiers Qty    98096851383  PT THERAPEUTIC ACT EA 15 MIN 3/5/2018 Kelsy Kenney, PT GP 1          PT G-Codes  Outcome Measure  Options: AM-PAC 6 Clicks Basic Mobility (PT)    PT Discharge Summary  Anticipated Discharge Disposition: home with assist  Reason for Discharge: Discharge from facility  Outcomes Achieved: Patient able to partially acheive established goals  Discharge Destination: Home with assist    Kelsy Kenney, PT  3/5/2018

## 2018-03-05 NOTE — PLAN OF CARE
Problem: Patient Care Overview (Adult)  Goal: Plan of Care Review  Outcome: Ongoing (interventions implemented as appropriate)    Goal: Adult Individualization and Mutuality  Outcome: Ongoing (interventions implemented as appropriate)    Goal: Discharge Needs Assessment  Outcome: Ongoing (interventions implemented as appropriate)      Problem: Sepsis (Adult)  Goal: Signs and Symptoms of Listed Potential Problems Will be Absent or Manageable (Sepsis)  Outcome: Ongoing (interventions implemented as appropriate)      Problem: Pressure Ulcer Risk (Donis Scale) (Adult,Obstetrics,Pediatric)  Goal: Skin Integrity  Outcome: Ongoing (interventions implemented as appropriate)

## 2018-03-05 NOTE — PLAN OF CARE
Problem: Patient Care Overview (Adult)  Goal: Plan of Care Review  Outcome: Ongoing (interventions implemented as appropriate)   03/05/18 1313   Patient Care Overview   Progress progress toward functional goals as expected   Outcome Evaluation   Outcome Summary/Follow up Plan Mobilizinf independently. Going home with family.       Problem: Inpatient Physical Therapy  Goal: Bed Mobility Goal LTG- PT   03/04/18 0846 03/05/18 1313   Bed Mobility PT LTG   Bed Mobility PT LTG, Date Established 03/02/18 --    Bed Mobility PT LTG, Time to Achieve 2 wks --    Bed Mobility PT LTG, Activity Type supine to sit/sit to supine --    Bed Mobility PT LTG, [place] Level independent --    Bed Mobility PT LTG, Date Goal Reviewed 03/04/18 --    Bed Mobility PT LTG, Outcome --  goal met     Goal: Transfer Training Goal 1 LTG- PT   03/04/18 0846 03/05/18 1313   Transfer Training PT LTG   Transfer Training PT LTG, Date Established 03/02/18 --    Transfer Training PT LTG, Time to Achieve 2 wks --    Transfer Training PT LTG, Activity Type sit to stand/stand to sit --    Transfer Training PT LTG, [place] Level independent --    Transfer Training PT LTG, Date Goal Reviewed 03/04/18 --    Transfer Training PT LTG, Outcome --  goal met     Goal: Gait Training Goal LTG- PT   03/04/18 0846 03/05/18 1313   Gait Training PT LTG   Gait Training Goal PT LTG, Date Established 03/02/18 --    Gait Training Goal PT LTG, Time to Achieve 2 wks --    Gait Training Goal PT LTG, [place] Level supervision required --    Gait Training Goal PT LTG, Distance to Achieve 300 feet --    Gait Training Goal PT LTG, Date Goal Reviewed 03/04/18 --    Gait Training Goal PT LTG, Outcome --  goal met     Goal: Stair Training Goal LTG- PT   03/04/18 0846 03/05/18 1313   Stair Training PT LTG   Stair Training Goal PT LTG, Date Established 03/02/18 --    Stair Training Goal PT LTG, Time to Achieve 2 wks --    Stair Training Goal PT LTG, Number of Steps 12 --     Stair Training Goal PT LTG, [place] Level supervision required --    Stair Training Goal PT LTG, Assist Device 1 handrail --    Stair Training Goal PT LTG, Date Goal Reviewed 03/04/18 --    Stair Training Goal PT LTG, Outcome --  goal partially met

## 2018-03-05 NOTE — PROGRESS NOTES
"Alex Talbert  1955  [id number]    Surgery Progress Note    Date of visit: 3/5/2018    Subjective: Denies complaints  Eating well  Ambulating    Objective:    /87 (BP Location: Left arm, Patient Position: Lying)  Pulse 67  Temp 98.7 °F (37.1 °C) (Oral)   Resp 18  Ht 182.9 cm (72\")  Wt 82.1 kg (180 lb 14.4 oz)  SpO2 94%  BMI 24.53 kg/m2    Intake/Output Summary (Last 24 hours) at 03/05/18 0853  Last data filed at 03/05/18 0848   Gross per 24 hour   Intake             1800 ml   Output              180 ml   Net             1620 ml       CV: Regular rate and rhythm  L: normal air entry    Abd: Soft and nontender  Wound is clean and packed  Anam-Hidalgo with serous drainage      LABS:      Results from last 7 days  Lab Units 03/02/18  0319   WBC 10*3/mm3 7.98   HEMOGLOBIN g/dL 13.2   HEMATOCRIT % 39.5   PLATELETS 10*3/mm3 335       Results from last 7 days  Lab Units 03/03/18 1909 03/03/18 0515  02/28/18  0444   SODIUM mmol/L  --  139  < > 143   POTASSIUM mmol/L 4.3 3.2*  < > 3.5   CHLORIDE mmol/L  --  107  < > 110*   CO2 mmol/L  --  29.0  < > 24.0   BUN mg/dL  --  5*  < > 7*   CREATININE mg/dL  --  0.70  < > 0.60   CALCIUM mg/dL  --  8.7  < > 8.4*   BILIRUBIN mg/dL  --   --   --  1.0   ALK PHOS U/L  --   --   --  61   ALT (SGPT) U/L  --   --   --  25   AST (SGOT) U/L  --   --   --  23   GLUCOSE mg/dL  --  109*  < > 148*   < > = values in this interval not displayed.    Results from last 7 days  Lab Units 03/03/18 1909 03/03/18 0515   SODIUM mmol/L  --  139   POTASSIUM mmol/L 4.3 3.2*   CHLORIDE mmol/L  --  107   CO2 mmol/L  --  29.0   BUN mg/dL  --  5*   CREATININE mg/dL  --  0.70   GLUCOSE mg/dL  --  109*   CALCIUM mg/dL  --  8.7       Lab Results  Lab Value Date/Time   LIPASE 38 02/24/2018 9178         Assessment/ Plan: Stable course status post repair of perforated duodenal ulcer and cholecystectomy  Plan to remove Anam-Hidalgo drain   Patient may go home today  Continue with wound packing " using normal saline wet-to-dry once a day  Protonix daily  Follow-up in 1 week    Problem List Items Addressed This Visit     Influenza B - Primary    Typical atrial flutter    Relevant Medications    digoxin (LANOXIN) injection 500 mcg (Completed)    apixaban (ELIQUIS) tablet 5 mg    Other Relevant Orders    Electrical Cardioversion (Completed)      Other Visit Diagnoses     Diabetic ketoacidosis without coma associated with other specified diabetes mellitus        Acute renal failure, unspecified acute renal failure type        Metabolic acidosis        Cholecystitis        Relevant Orders    Tissue Pathology Exam (Completed)            Radha Siu MD  3/5/2018  8:53 AM

## 2018-03-05 NOTE — PROGRESS NOTES
"[facility] Daily Note       LOS: 9 days   Patient Care Team:  Dayo Richardson MD as PCP - General (Family Medicine)    Chief Complaint:  Atrial flutter    Subjective     Subjective: No further atrial flutter.  He has been up on the floor and is doing well.  He is anticipated to be discharged home today.    Review of Systems:   As above.    Medications:    apixaban 5 mg Oral Q12H   insulin detemir 10 Units Subcutaneous Nightly   insulin lispro 0-9 Units Subcutaneous 4x Daily With Meals & Nightly   insulin lispro 3 Units Subcutaneous TID With Meals   pantoprazole 40 mg Oral BID AC       Objective     Vital Sign Min/Max for last 24 hours  Temp  Min: 98.5 °F (36.9 °C)  Max: 99.2 °F (37.3 °C)   BP  Min: 123/88  Max: 143/87   Pulse  Min: 67  Max: 70   Resp  Min: 16  Max: 18   SpO2  Min: 94 %  Max: 96 %   No Data Recorded   Weight  Min: 82.1 kg (180 lb 14.4 oz)  Max: 82.1 kg (180 lb 14.4 oz)      Intake/Output Summary (Last 24 hours) at 03/05/18 1125  Last data filed at 03/05/18 0848   Gross per 24 hour   Intake             1320 ml   Output              140 ml   Net             1180 ml        Flowsheet Rows         First Filed Value    Admission Height  182.9 cm (72\") Documented at 02/24/2018 1213    Admission Weight  88.5 kg (195 lb) Documented at 02/24/2018 1213          Physical Exam:    General: Alert and oriented.   Cardiovascular: Heart has a nondisplaced focal PMI. Regular rate and rhythm without murmur, gallop or rub.  Lungs: Clear without rales or wheezes. Equal expansion is noted.  Slightly decreased bases  Abdomen: Soft, tender.  Extremities: Show no edema.   Skin: warm and dry.     Results Review:    I reviewed the patient's new clinical results.  EKG:  Tele: Normal sinus rhythm    Labs:      Results from last 7 days  Lab Units 03/03/18  1909 03/03/18  0515 03/02/18  1759 03/02/18  0320 03/01/18  0505  02/28/18  0444   SODIUM mmol/L  --  139  --  144 144  --  143   POTASSIUM mmol/L 4.3 3.2* 3.9 " 3.3*  3.3* 3.1*  < > 3.5   CHLORIDE mmol/L  --  107  --  111* 109  --  110*   CO2 mmol/L  --  29.0  --  27.0 26.0  --  24.0   BUN mg/dL  --  5*  --  <5* 6*  --  7*   CREATININE mg/dL  --  0.70  --  0.60 0.60  --  0.60   CALCIUM mg/dL  --  8.7  --  8.2* 8.2*  --  8.4*   BILIRUBIN mg/dL  --   --   --   --   --   --  1.0   ALK PHOS U/L  --   --   --   --   --   --  61   ALT (SGPT) U/L  --   --   --   --   --   --  25   AST (SGOT) U/L  --   --   --   --   --   --  23   GLUCOSE mg/dL  --  109*  --  143* 63*  --  148*   < > = values in this interval not displayed.    Results from last 7 days  Lab Units 03/02/18  0319 03/01/18  0505 02/28/18  0444   WBC 10*3/mm3 7.98 9.68 10.88*   HEMOGLOBIN g/dL 13.2 13.4 13.5   HEMATOCRIT % 39.5 39.3 39.9   PLATELETS 10*3/mm3 335 326 230     Lab Results   Component Value Date    TROPONINI 0.023 02/26/2018     No results found for: CHOL  No results found for: TRIG  No results found for: HDL  No components found for: LDLCALC  Lab Results   Component Value Date    INR 1.07 03/01/2018    PROTIME 11.2 03/01/2018         Ejection Fraction:  Unknown    Assessment   Assessment:    1.  Atrial flutter                        -asymptomatic                        -Status post external cardioversion.  Currently on Eliquis therapy with no antiarrhythmic.  2.  Diabetes Mellitus                        -DKA on arrival  3.  Duodenal ulcer perforated / pneumoperitoneum                        A.  Surgical repair 2/25/18  4.  Influenza B      Plan:    Discharge home today on Eliquis 5 mg every 12 hours  Check pulse every morning and every evening  Follow-up with me in 6 weeks.  At that time we may consider a change to 325 mg of aspirin as the patient's chads VASC score is 1.      Khadra Mendiola MD  03/05/18  11:25 AM

## 2018-03-05 NOTE — PROGRESS NOTES
Adult Nutrition  Assessment/PES    Patient Name:  Alex Talbert  YOB: 1955  MRN: [id number]  Admit Date:  2/24/2018    Assessment Date:  3/5/2018    Comments:            Reason for Assessment       03/05/18 1108    Reason for Assessment    Reason For Assessment/Visit follow up protocol    Time Spent (min) 15    Diagnosis --   Per notes this admission.   Principal Problem:    Perforated duodenal ulcer  Active Problems:    Type 2 diabetes mellitus    Typical atrial flutter                 Labs/Tests/Procedures/Meds       03/05/18 1109    Labs/Tests/Procedures/Meds    Labs/Tests Review Reviewed                Nutrition Prescription Ordered       03/05/18 1109    Nutrition Prescription PO    Current PO Diet Regular    Supplement Boost Glucose Control    Supplement Frequency 3 times a day    Common Modifiers Consistent Carbohydrate            Evaluation of Received Nutrient/Fluid Intake       03/05/18 1109    PO Evaluation    Number of Meals 4    % PO Intake 69            Problem/Interventions:          Problem 2       03/05/18 1110    Nutrition Diagnoses Problem 2    Problem 2 Altered Nutrition Related to Labs    Etiology (related to) --   T2DM, ?diet and lifestyle choices    Signs/Symptoms (evidenced by) Biochemical    Specific Labs Noted HgbA1C   12.4%                  Intervention Goal       03/05/18 1110    Intervention Goal    General Nutrition support treatment    PO Maintain intake            Nutrition Intervention       03/05/18 1110    Nutrition Intervention    RD/Tech Action Care plan reviewd;Other (comment)   Pt with discharge orders              Education/Evaluation       03/05/18 1111    Education    Education Other (comment)   Mailed DM handouts as pt discharged    Monitor/Evaluation    Monitor Per protocol        Electronically signed by:  Dafne Mcdermott  03/05/18 11:14 AM

## 2018-03-14 ENCOUNTER — TELEPHONE (OUTPATIENT)
Dept: INTERNAL MEDICINE | Facility: CLINIC | Age: 63
End: 2018-03-14

## 2018-03-21 ENCOUNTER — OFFICE VISIT (OUTPATIENT)
Dept: ENDOCRINOLOGY | Facility: CLINIC | Age: 63
End: 2018-03-21

## 2018-03-21 VITALS
WEIGHT: 185.3 LBS | OXYGEN SATURATION: 98 % | HEART RATE: 80 BPM | DIASTOLIC BLOOD PRESSURE: 80 MMHG | BODY MASS INDEX: 25.1 KG/M2 | SYSTOLIC BLOOD PRESSURE: 140 MMHG | HEIGHT: 72 IN

## 2018-03-21 DIAGNOSIS — Z79.4 UNCONTROLLED TYPE 2 DIABETES MELLITUS WITH KETOACIDOSIS WITHOUT COMA, WITH LONG-TERM CURRENT USE OF INSULIN (HCC): Primary | ICD-10-CM

## 2018-03-21 DIAGNOSIS — E11.10 UNCONTROLLED TYPE 2 DIABETES MELLITUS WITH KETOACIDOSIS WITHOUT COMA, WITH LONG-TERM CURRENT USE OF INSULIN (HCC): Primary | ICD-10-CM

## 2018-03-21 LAB
GLUCOSE BLDC GLUCOMTR-MCNC: 171 MG/DL (ref 70–130)
HBA1C MFR BLD: 10 %

## 2018-03-21 PROCEDURE — 82043 UR ALBUMIN QUANTITATIVE: CPT | Performed by: PHYSICIAN ASSISTANT

## 2018-03-21 PROCEDURE — 82947 ASSAY GLUCOSE BLOOD QUANT: CPT | Performed by: PHYSICIAN ASSISTANT

## 2018-03-21 PROCEDURE — 82570 ASSAY OF URINE CREATININE: CPT | Performed by: PHYSICIAN ASSISTANT

## 2018-03-21 PROCEDURE — 83036 HEMOGLOBIN GLYCOSYLATED A1C: CPT | Performed by: PHYSICIAN ASSISTANT

## 2018-03-21 PROCEDURE — 99204 OFFICE O/P NEW MOD 45 MIN: CPT | Performed by: PHYSICIAN ASSISTANT

## 2018-03-21 RX ORDER — METFORMIN HYDROCHLORIDE 500 MG/1
500 TABLET, EXTENDED RELEASE ORAL
Qty: 120 TABLET | Refills: 3 | Status: SHIPPED | OUTPATIENT
Start: 2018-03-21 | End: 2018-04-02 | Stop reason: SDUPTHER
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
## 2018-03-21 NOTE — PATIENT INSTRUCTIONS
Start metformin (Glucophage)- start with 500mg one pill day, increase to 4 pills daily as tolerated  Start weekly Bydureon shot  Continue meal time insulin 5 units before each meal for now, plan to try to get off this later  Increase levemir by 2 units every 3 days until morning sugar less than 150.   Start lipitor (atorvastatin) 40mg at bedtime. This is the cholesterol medicine.

## 2018-03-23 LAB
CREAT 24H UR-MCNC: 114.9 MG/DL
MICROALBUMIN UR-MCNC: 223.8 UG/ML
MICROALBUMIN/CREAT UR: 194.8 MG/G CREAT (ref 0–30)

## 2018-03-25 ENCOUNTER — PRIOR AUTHORIZATION (OUTPATIENT)
Dept: INTERNAL MEDICINE | Facility: CLINIC | Age: 63
End: 2018-03-25

## 2018-04-02 DIAGNOSIS — Z79.4 UNCONTROLLED TYPE 2 DIABETES MELLITUS WITH HYPERGLYCEMIA, WITH LONG-TERM CURRENT USE OF INSULIN (HCC): Primary | ICD-10-CM

## 2018-04-02 DIAGNOSIS — E11.10 UNCONTROLLED TYPE 2 DIABETES MELLITUS WITH KETOACIDOSIS WITHOUT COMA, WITH LONG-TERM CURRENT USE OF INSULIN (HCC): ICD-10-CM

## 2018-04-02 DIAGNOSIS — Z79.4 UNCONTROLLED TYPE 2 DIABETES MELLITUS WITH KETOACIDOSIS WITHOUT COMA, WITH LONG-TERM CURRENT USE OF INSULIN (HCC): ICD-10-CM

## 2018-04-02 DIAGNOSIS — E11.65 UNCONTROLLED TYPE 2 DIABETES MELLITUS WITH HYPERGLYCEMIA, WITH LONG-TERM CURRENT USE OF INSULIN (HCC): Primary | ICD-10-CM

## 2018-04-02 RX ORDER — METFORMIN HYDROCHLORIDE 500 MG/1
TABLET, EXTENDED RELEASE ORAL
Qty: 120 TABLET | Refills: 3 | Status: SHIPPED | OUTPATIENT
Start: 2018-04-02 | End: 2018-07-06

## 2018-04-18 ENCOUNTER — OFFICE VISIT (OUTPATIENT)
Dept: CARDIOLOGY | Facility: CLINIC | Age: 63
End: 2018-04-18

## 2018-04-18 VITALS
HEIGHT: 72 IN | HEART RATE: 89 BPM | WEIGHT: 178 LBS | BODY MASS INDEX: 24.11 KG/M2 | DIASTOLIC BLOOD PRESSURE: 68 MMHG | SYSTOLIC BLOOD PRESSURE: 118 MMHG

## 2018-04-18 DIAGNOSIS — I48.3 TYPICAL ATRIAL FLUTTER (HCC): Primary | ICD-10-CM

## 2018-04-18 PROCEDURE — 99213 OFFICE O/P EST LOW 20 MIN: CPT | Performed by: INTERNAL MEDICINE

## 2018-04-18 NOTE — PROGRESS NOTES
"Alex Talbert  1955  [phone]      04/18/2018    Dayo Richardson MD    Chief Complaint   Patient presents with   • Atrial Flutter       Problem List:  1. Atrial flutter  a. Asymptomatic  b. Echo 3/2/2018: EF 55%.  Trace to mild MR. No thrombus is seen in left atrium or left atrial appendage.  c. Status post external cardioversion.  Currently on Eliquis therapy with no antiarrhythmic  2. Diabetes mellitus  3. Duodenal ulcer perforation/pneumoperitoneum  a. Surgical repair 2/25/18  4. Influenza B    Allergies   Allergen Reactions   • Codeine Nausea Only       Current Medications:      Current Outpatient Prescriptions:   •  apixaban (ELIQUIS) 5 MG tablet tablet, Take 1 tablet by mouth Every 12 (Twelve) Hours., Disp: 60 tablet, Rfl: 1  •  Insulin Syringe-Needle U-100 (TRUEPLUS INSULIN SYRINGE) 31G X 5/16\" 0.3 ML misc, Use as directed with Levemir and Novolog insulin, Disp: 100 each, Rfl: 0  •  metFORMIN ER (GLUCOPHAGE-XR) 500 MG 24 hr tablet, Take 4 tabs qd, Disp: 120 tablet, Rfl: 3  •  Multiple Vitamins-Minerals (MULTIVITAMIN MEN 50+) tablet, Take 1 tablet by mouth Daily., Disp: , Rfl:   •  pantoprazole (PROTONIX) 40 MG EC tablet, Take 1 tablet twice daily for 30 days, then take 1 tablet by mouth daily Before Meals., Disp: 60 tablet, Rfl: 1  •  insulin detemir (LEVEMIR) 100 UNIT/ML injection, Inject 10 Units under the skin Every Night. (Patient taking differently: Inject 12 Units under the skin Every Night.), Disp: 10 mL, Rfl: 1    HPI    Alex Talbert presents today for 6 week follow up of atrial flutter. Since last visit, patient has been doing well from a a cardiac standpoint. Has not had any recent episodes of atrial flutter since his discharge from the hospital. Monitors his pulse with a fit bit and blood pressure monitor at home, with readings usally around 70 at rest. Patient denies chest pain, palpitations, shortness of breath, edema, PND, orthopnea, dizziness, and syncope. Remains busy and " "active with walking 30-45 min with no limitations.    The following portions of the patient's history were reviewed and updated as appropriate: allergies, current medications and problem list.    Pertinent positives as listed in the HPI.  All other systems reviewed are negative.    Vitals:    04/18/18 0954   BP: 118/68   BP Location: Right arm   Patient Position: Sitting   Pulse: 89   Weight: 80.7 kg (178 lb)   Height: 182.9 cm (72\")       Physical Exam:  General: Alert and oriented to person, place, and time.  Neck: Jugular venous pressure is within normal limits. Carotids have normal upstrokes without bruits.   Cardiovascular: Regular rate and rhythm without murmur gallop or rub.  Lungs: Clear without rales or wheezes. Equal expansion is noted.   Extremities: Show no edema.  Skin: warm and dry.  Neurologic: nonfocal    Diagnostic Data:    Procedures    Assessment:      ICD-10-CM ICD-9-CM   1. Typical atrial flutter I48.3 427.32       Plan:    1. Slowly increase intensity of exercise.  2. DC Eliquis 5 mg daily.  3. Begin aspirin 3.25 mg   4. Monitor pulse every morning and night manually to assess rate, rhythm, and strength.   5. Continue current medications.  6. F/up in 12 months or sooner if needed.    Scribed for Khadra Mendiola MD by Aguila Harrison. 4/18/2018  10:05 AM     I Khadra Mendiola MD personally performed the services described in this documentation as scribed by the above individual in my presence, and it is both accurate and complete.    Khadra Mendiola MD, FACC      "

## 2018-05-03 ENCOUNTER — OFFICE VISIT (OUTPATIENT)
Dept: ENDOCRINOLOGY | Facility: CLINIC | Age: 63
End: 2018-05-03

## 2018-05-03 VITALS
SYSTOLIC BLOOD PRESSURE: 138 MMHG | BODY MASS INDEX: 23.86 KG/M2 | WEIGHT: 176.2 LBS | DIASTOLIC BLOOD PRESSURE: 80 MMHG | HEIGHT: 72 IN | HEART RATE: 72 BPM | OXYGEN SATURATION: 98 %

## 2018-05-03 DIAGNOSIS — E11.10 UNCONTROLLED TYPE 2 DIABETES MELLITUS WITH KETOACIDOSIS WITHOUT COMA, WITH LONG-TERM CURRENT USE OF INSULIN (HCC): Primary | ICD-10-CM

## 2018-05-03 DIAGNOSIS — Z79.4 UNCONTROLLED TYPE 2 DIABETES MELLITUS WITH KETOACIDOSIS WITHOUT COMA, WITH LONG-TERM CURRENT USE OF INSULIN (HCC): Primary | ICD-10-CM

## 2018-05-03 LAB — GLUCOSE BLDC GLUCOMTR-MCNC: 118 MG/DL (ref 70–130)

## 2018-05-03 PROCEDURE — 82947 ASSAY GLUCOSE BLOOD QUANT: CPT | Performed by: PHYSICIAN ASSISTANT

## 2018-05-03 PROCEDURE — 99214 OFFICE O/P EST MOD 30 MIN: CPT | Performed by: PHYSICIAN ASSISTANT
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
## 2018-07-06 ENCOUNTER — OFFICE VISIT (OUTPATIENT)
Dept: ENDOCRINOLOGY | Facility: CLINIC | Age: 63
End: 2018-07-06

## 2018-07-06 VITALS
DIASTOLIC BLOOD PRESSURE: 78 MMHG | SYSTOLIC BLOOD PRESSURE: 122 MMHG | WEIGHT: 164.1 LBS | OXYGEN SATURATION: 100 % | HEART RATE: 62 BPM | BODY MASS INDEX: 22.23 KG/M2 | HEIGHT: 72 IN

## 2018-07-06 DIAGNOSIS — E11.9 CONTROLLED TYPE 2 DIABETES MELLITUS WITHOUT COMPLICATION, WITHOUT LONG-TERM CURRENT USE OF INSULIN (HCC): Primary | ICD-10-CM

## 2018-07-06 LAB
ARTICHOKE IGE QN: 94 MG/DL (ref 0–130)
CHOLEST SERPL-MCNC: 168 MG/DL (ref 0–200)
GLUCOSE BLDC GLUCOMTR-MCNC: 71 MG/DL (ref 70–130)
HBA1C MFR BLD: 5.5 %
HDLC SERPL-MCNC: 53 MG/DL (ref 40–60)
TRIGL SERPL-MCNC: 87 MG/DL (ref 0–150)

## 2018-07-06 PROCEDURE — 99214 OFFICE O/P EST MOD 30 MIN: CPT | Performed by: PHYSICIAN ASSISTANT

## 2018-07-06 PROCEDURE — 82043 UR ALBUMIN QUANTITATIVE: CPT | Performed by: PHYSICIAN ASSISTANT

## 2018-07-06 PROCEDURE — 82947 ASSAY GLUCOSE BLOOD QUANT: CPT | Performed by: PHYSICIAN ASSISTANT

## 2018-07-06 PROCEDURE — 82570 ASSAY OF URINE CREATININE: CPT | Performed by: PHYSICIAN ASSISTANT

## 2018-07-06 PROCEDURE — 83036 HEMOGLOBIN GLYCOSYLATED A1C: CPT | Performed by: PHYSICIAN ASSISTANT

## 2018-07-06 PROCEDURE — 80061 LIPID PANEL: CPT | Performed by: PHYSICIAN ASSISTANT

## 2018-07-06 RX ORDER — INSULIN GLARGINE 100 [IU]/ML
10 INJECTION, SOLUTION SUBCUTANEOUS DAILY
COMMUNITY
End: 2018-07-06
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
## 2018-07-07 LAB
CREAT 24H UR-MCNC: 137.2 MG/DL
MICROALBUMIN UR-MCNC: 10.1 UG/ML
MICROALBUMIN/CREAT UR: 7.4 MG/G CREAT (ref 0–30)

## 2018-10-11 ENCOUNTER — OFFICE VISIT (OUTPATIENT)
Dept: ENDOCRINOLOGY | Facility: CLINIC | Age: 63
End: 2018-10-11

## 2018-10-11 VITALS
BODY MASS INDEX: 23.08 KG/M2 | OXYGEN SATURATION: 98 % | DIASTOLIC BLOOD PRESSURE: 84 MMHG | HEART RATE: 78 BPM | WEIGHT: 170.25 LBS | SYSTOLIC BLOOD PRESSURE: 132 MMHG

## 2018-10-11 DIAGNOSIS — E11.9 CONTROLLED TYPE 2 DIABETES MELLITUS WITHOUT COMPLICATION, WITHOUT LONG-TERM CURRENT USE OF INSULIN (HCC): Primary | ICD-10-CM

## 2018-10-11 LAB
GLUCOSE BLDC GLUCOMTR-MCNC: 94 MG/DL (ref 70–130)
HBA1C MFR BLD: 5.2 %

## 2018-10-11 PROCEDURE — 99214 OFFICE O/P EST MOD 30 MIN: CPT | Performed by: PHYSICIAN ASSISTANT

## 2018-10-11 PROCEDURE — 90471 IMMUNIZATION ADMIN: CPT | Performed by: PHYSICIAN ASSISTANT

## 2018-10-11 PROCEDURE — 83036 HEMOGLOBIN GLYCOSYLATED A1C: CPT | Performed by: PHYSICIAN ASSISTANT

## 2018-10-11 PROCEDURE — 82947 ASSAY GLUCOSE BLOOD QUANT: CPT | Performed by: PHYSICIAN ASSISTANT

## 2018-10-11 PROCEDURE — 90674 CCIIV4 VAC NO PRSV 0.5 ML IM: CPT | Performed by: PHYSICIAN ASSISTANT

## 2018-10-11 RX ORDER — ASPIRIN 81 MG/1
81 TABLET ORAL DAILY
COMMUNITY
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
## 2019-03-11 ENCOUNTER — OFFICE VISIT (OUTPATIENT)
Dept: ENDOCRINOLOGY | Facility: CLINIC | Age: 64
End: 2019-03-11

## 2019-03-11 VITALS
HEART RATE: 68 BPM | WEIGHT: 182.4 LBS | DIASTOLIC BLOOD PRESSURE: 82 MMHG | OXYGEN SATURATION: 98 % | BODY MASS INDEX: 24.73 KG/M2 | SYSTOLIC BLOOD PRESSURE: 144 MMHG

## 2019-03-11 DIAGNOSIS — E11.9 CONTROLLED TYPE 2 DIABETES MELLITUS WITHOUT COMPLICATION, WITHOUT LONG-TERM CURRENT USE OF INSULIN (HCC): Primary | ICD-10-CM

## 2019-03-11 LAB
GLUCOSE BLDC GLUCOMTR-MCNC: 95 MG/DL (ref 70–130)
HBA1C MFR BLD: 5.4 %

## 2019-03-11 PROCEDURE — 82947 ASSAY GLUCOSE BLOOD QUANT: CPT | Performed by: PHYSICIAN ASSISTANT

## 2019-03-11 PROCEDURE — 99214 OFFICE O/P EST MOD 30 MIN: CPT | Performed by: PHYSICIAN ASSISTANT

## 2019-03-11 PROCEDURE — 83036 HEMOGLOBIN GLYCOSYLATED A1C: CPT | Performed by: PHYSICIAN ASSISTANT
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
## 2019-07-12 ENCOUNTER — OFFICE VISIT (OUTPATIENT)
Dept: ENDOCRINOLOGY | Facility: CLINIC | Age: 64
End: 2019-07-12

## 2019-07-12 VITALS
WEIGHT: 188 LBS | SYSTOLIC BLOOD PRESSURE: 130 MMHG | HEART RATE: 65 BPM | DIASTOLIC BLOOD PRESSURE: 80 MMHG | OXYGEN SATURATION: 99 % | BODY MASS INDEX: 25.49 KG/M2

## 2019-07-12 DIAGNOSIS — E11.9 CONTROLLED TYPE 2 DIABETES MELLITUS WITHOUT COMPLICATION, WITHOUT LONG-TERM CURRENT USE OF INSULIN (HCC): Primary | ICD-10-CM

## 2019-07-12 LAB
ALBUMIN SERPL-MCNC: 4.6 G/DL (ref 3.5–5.2)
ALBUMIN/GLOB SERPL: 1.7 G/DL
ALP SERPL-CCNC: 87 U/L (ref 39–117)
ALT SERPL W P-5'-P-CCNC: 23 U/L (ref 1–41)
ANION GAP SERPL CALCULATED.3IONS-SCNC: 14.4 MMOL/L (ref 5–15)
AST SERPL-CCNC: 22 U/L (ref 1–40)
BASOPHILS # BLD AUTO: 0.05 10*3/MM3 (ref 0–0.2)
BASOPHILS NFR BLD AUTO: 0.8 % (ref 0–1.5)
BILIRUB SERPL-MCNC: 1 MG/DL (ref 0.2–1.2)
BUN BLD-MCNC: 15 MG/DL (ref 8–23)
BUN/CREAT SERPL: 17.4 (ref 7–25)
CALCIUM SPEC-SCNC: 10.2 MG/DL (ref 8.6–10.5)
CHLORIDE SERPL-SCNC: 104 MMOL/L (ref 98–107)
CHOLEST SERPL-MCNC: 178 MG/DL (ref 0–200)
CO2 SERPL-SCNC: 25.6 MMOL/L (ref 22–29)
CREAT BLD-MCNC: 0.86 MG/DL (ref 0.76–1.27)
DEPRECATED RDW RBC AUTO: 46.3 FL (ref 37–54)
EOSINOPHIL # BLD AUTO: 0.2 10*3/MM3 (ref 0–0.4)
EOSINOPHIL NFR BLD AUTO: 3.1 % (ref 0.3–6.2)
ERYTHROCYTE [DISTWIDTH] IN BLOOD BY AUTOMATED COUNT: 13.5 % (ref 12.3–15.4)
GFR SERPL CREATININE-BSD FRML MDRD: 90 ML/MIN/1.73
GLOBULIN UR ELPH-MCNC: 2.7 GM/DL
GLUCOSE BLD-MCNC: 85 MG/DL (ref 65–99)
GLUCOSE BLDC GLUCOMTR-MCNC: 109 MG/DL (ref 70–130)
HBA1C MFR BLD: 5.3 %
HCT VFR BLD AUTO: 50.2 % (ref 37.5–51)
HDLC SERPL-MCNC: 57 MG/DL (ref 40–60)
HGB BLD-MCNC: 15.7 G/DL (ref 13–17.7)
IMM GRANULOCYTES # BLD AUTO: 0.02 10*3/MM3 (ref 0–0.05)
IMM GRANULOCYTES NFR BLD AUTO: 0.3 % (ref 0–0.5)
LDLC SERPL CALC-MCNC: 100 MG/DL (ref 0–100)
LDLC/HDLC SERPL: 1.75 {RATIO}
LYMPHOCYTES # BLD AUTO: 1.54 10*3/MM3 (ref 0.7–3.1)
LYMPHOCYTES NFR BLD AUTO: 23.5 % (ref 19.6–45.3)
MCH RBC QN AUTO: 29.3 PG (ref 26.6–33)
MCHC RBC AUTO-ENTMCNC: 31.3 G/DL (ref 31.5–35.7)
MCV RBC AUTO: 93.8 FL (ref 79–97)
MONOCYTES # BLD AUTO: 0.56 10*3/MM3 (ref 0.1–0.9)
MONOCYTES NFR BLD AUTO: 8.5 % (ref 5–12)
NEUTROPHILS # BLD AUTO: 4.18 10*3/MM3 (ref 1.7–7)
NEUTROPHILS NFR BLD AUTO: 63.8 % (ref 42.7–76)
NRBC BLD AUTO-RTO: 0 /100 WBC (ref 0–0.2)
PLATELET # BLD AUTO: 215 10*3/MM3 (ref 140–450)
PMV BLD AUTO: 12.1 FL (ref 6–12)
POTASSIUM BLD-SCNC: 4.6 MMOL/L (ref 3.5–5.2)
PROT SERPL-MCNC: 7.3 G/DL (ref 6–8.5)
RBC # BLD AUTO: 5.35 10*6/MM3 (ref 4.14–5.8)
SODIUM BLD-SCNC: 144 MMOL/L (ref 136–145)
TRIGL SERPL-MCNC: 105 MG/DL (ref 0–150)
TSH SERPL DL<=0.05 MIU/L-ACNC: 3.71 MIU/ML (ref 0.27–4.2)
VLDLC SERPL-MCNC: 21 MG/DL (ref 5–40)
WBC NRBC COR # BLD: 6.55 10*3/MM3 (ref 3.4–10.8)

## 2019-07-12 PROCEDURE — 82947 ASSAY GLUCOSE BLOOD QUANT: CPT | Performed by: PHYSICIAN ASSISTANT

## 2019-07-12 PROCEDURE — 85025 COMPLETE CBC W/AUTO DIFF WBC: CPT | Performed by: PHYSICIAN ASSISTANT

## 2019-07-12 PROCEDURE — 83036 HEMOGLOBIN GLYCOSYLATED A1C: CPT | Performed by: PHYSICIAN ASSISTANT

## 2019-07-12 PROCEDURE — 80053 COMPREHEN METABOLIC PANEL: CPT | Performed by: PHYSICIAN ASSISTANT

## 2019-07-12 PROCEDURE — 84443 ASSAY THYROID STIM HORMONE: CPT | Performed by: PHYSICIAN ASSISTANT

## 2019-07-12 PROCEDURE — 99214 OFFICE O/P EST MOD 30 MIN: CPT | Performed by: PHYSICIAN ASSISTANT

## 2019-07-12 PROCEDURE — 82043 UR ALBUMIN QUANTITATIVE: CPT | Performed by: PHYSICIAN ASSISTANT

## 2019-07-12 PROCEDURE — 82570 ASSAY OF URINE CREATININE: CPT | Performed by: PHYSICIAN ASSISTANT

## 2019-07-12 PROCEDURE — 80061 LIPID PANEL: CPT | Performed by: PHYSICIAN ASSISTANT
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
## 2019-07-13 LAB
ALBUMIN UR-MCNC: <1.2 MG/L
CREAT UR-MCNC: 131.6 MG/DL
MICROALBUMIN/CREAT UR: NORMAL MG/G

## 2020-01-13 ENCOUNTER — OFFICE VISIT (OUTPATIENT)
Dept: ENDOCRINOLOGY | Facility: CLINIC | Age: 65
End: 2020-01-13

## 2020-01-13 VITALS
HEART RATE: 69 BPM | DIASTOLIC BLOOD PRESSURE: 80 MMHG | BODY MASS INDEX: 26.11 KG/M2 | SYSTOLIC BLOOD PRESSURE: 122 MMHG | WEIGHT: 192.6 LBS | OXYGEN SATURATION: 98 %

## 2020-01-13 DIAGNOSIS — E11.9 CONTROLLED TYPE 2 DIABETES MELLITUS WITHOUT COMPLICATION, WITHOUT LONG-TERM CURRENT USE OF INSULIN (HCC): Primary | ICD-10-CM

## 2020-01-13 LAB
GLUCOSE BLDC GLUCOMTR-MCNC: 114 MG/DL (ref 70–130)
HBA1C MFR BLD: 5.5 %

## 2020-01-13 PROCEDURE — 99214 OFFICE O/P EST MOD 30 MIN: CPT | Performed by: PHYSICIAN ASSISTANT

## 2020-01-13 PROCEDURE — 83036 HEMOGLOBIN GLYCOSYLATED A1C: CPT | Performed by: PHYSICIAN ASSISTANT

## 2020-01-13 PROCEDURE — 82947 ASSAY GLUCOSE BLOOD QUANT: CPT | Performed by: PHYSICIAN ASSISTANT
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
## 2021-02-12 DIAGNOSIS — E11.9 CONTROLLED TYPE 2 DIABETES MELLITUS WITHOUT COMPLICATION, WITHOUT LONG-TERM CURRENT USE OF INSULIN (HCC): ICD-10-CM

## 2021-02-12 RX ORDER — DULAGLUTIDE 0.75 MG/.5ML
INJECTION, SOLUTION SUBCUTANEOUS
Qty: 6 ML | Refills: 1 | Status: SHIPPED | OUTPATIENT
Start: 2021-02-12

## 2023-12-09 ENCOUNTER — APPOINTMENT (OUTPATIENT)
Dept: GENERAL RADIOLOGY | Facility: HOSPITAL | Age: 68
End: 2023-12-09
Payer: MEDICARE

## 2023-12-09 ENCOUNTER — HOSPITAL ENCOUNTER (OUTPATIENT)
Facility: HOSPITAL | Age: 68
Setting detail: OBSERVATION
Discharge: HOME | End: 2023-12-09
Attending: EMERGENCY MEDICINE
Payer: MEDICARE

## 2023-12-09 VITALS
WEIGHT: 192 LBS | BODY MASS INDEX: 26.01 KG/M2 | HEART RATE: 90 BPM | SYSTOLIC BLOOD PRESSURE: 100 MMHG | HEIGHT: 72 IN | RESPIRATION RATE: 20 BRPM | TEMPERATURE: 97.8 F | DIASTOLIC BLOOD PRESSURE: 82 MMHG | OXYGEN SATURATION: 92 %

## 2023-12-09 DIAGNOSIS — J81.0 ACUTE PULMONARY EDEMA: ICD-10-CM

## 2023-12-09 DIAGNOSIS — Z86.39 HISTORY OF DIABETES MELLITUS: ICD-10-CM

## 2023-12-09 DIAGNOSIS — I48.91 ATRIAL FIBRILLATION WITH RAPID VENTRICULAR RESPONSE: Primary | ICD-10-CM

## 2023-12-09 DIAGNOSIS — R06.02 SHORTNESS OF BREATH: ICD-10-CM

## 2023-12-09 LAB
ALBUMIN SERPL-MCNC: 3.8 G/DL (ref 3.5–5.2)
ALBUMIN/GLOB SERPL: 1.4 G/DL
ALP SERPL-CCNC: 90 U/L (ref 39–117)
ALT SERPL W P-5'-P-CCNC: 24 U/L (ref 1–41)
ANION GAP SERPL CALCULATED.3IONS-SCNC: 11 MMOL/L (ref 5–15)
AST SERPL-CCNC: 20 U/L (ref 1–40)
BASOPHILS # BLD AUTO: 0.05 10*3/MM3 (ref 0–0.2)
BASOPHILS NFR BLD AUTO: 0.5 % (ref 0–1.5)
BILIRUB SERPL-MCNC: 1.4 MG/DL (ref 0–1.2)
BUN SERPL-MCNC: 16 MG/DL (ref 8–23)
BUN/CREAT SERPL: 16.3 (ref 7–25)
CALCIUM SPEC-SCNC: 9.3 MG/DL (ref 8.6–10.5)
CHLORIDE SERPL-SCNC: 100 MMOL/L (ref 98–107)
CO2 SERPL-SCNC: 26 MMOL/L (ref 22–29)
CREAT SERPL-MCNC: 0.98 MG/DL (ref 0.76–1.27)
DEPRECATED RDW RBC AUTO: 39.8 FL (ref 37–54)
EGFRCR SERPLBLD CKD-EPI 2021: 85 ML/MIN/1.73
EOSINOPHIL # BLD AUTO: 0.07 10*3/MM3 (ref 0–0.4)
EOSINOPHIL NFR BLD AUTO: 0.8 % (ref 0.3–6.2)
ERYTHROCYTE [DISTWIDTH] IN BLOOD BY AUTOMATED COUNT: 12.3 % (ref 12.3–15.4)
FLUAV RNA RESP QL NAA+PROBE: NOT DETECTED
FLUBV RNA RESP QL NAA+PROBE: NOT DETECTED
GLOBULIN UR ELPH-MCNC: 2.7 GM/DL
GLUCOSE SERPL-MCNC: 439 MG/DL (ref 65–99)
HBA1C MFR BLD: 11.8 % (ref 4.8–5.6)
HCT VFR BLD AUTO: 47.6 % (ref 37.5–51)
HGB BLD-MCNC: 15.5 G/DL (ref 13–17.7)
HOLD SPECIMEN: NORMAL
IMM GRANULOCYTES # BLD AUTO: 0.03 10*3/MM3 (ref 0–0.05)
IMM GRANULOCYTES NFR BLD AUTO: 0.3 % (ref 0–0.5)
LYMPHOCYTES # BLD AUTO: 1.13 10*3/MM3 (ref 0.7–3.1)
LYMPHOCYTES NFR BLD AUTO: 12.2 % (ref 19.6–45.3)
MCH RBC QN AUTO: 28.9 PG (ref 26.6–33)
MCHC RBC AUTO-ENTMCNC: 32.6 G/DL (ref 31.5–35.7)
MCV RBC AUTO: 88.6 FL (ref 79–97)
MONOCYTES # BLD AUTO: 0.53 10*3/MM3 (ref 0.1–0.9)
MONOCYTES NFR BLD AUTO: 5.7 % (ref 5–12)
NEUTROPHILS NFR BLD AUTO: 7.48 10*3/MM3 (ref 1.7–7)
NEUTROPHILS NFR BLD AUTO: 80.5 % (ref 42.7–76)
NRBC BLD AUTO-RTO: 0 /100 WBC (ref 0–0.2)
NT-PROBNP SERPL-MCNC: 3060 PG/ML (ref 0–900)
PLATELET # BLD AUTO: 328 10*3/MM3 (ref 140–450)
PMV BLD AUTO: 10.7 FL (ref 6–12)
POTASSIUM SERPL-SCNC: 4.7 MMOL/L (ref 3.5–5.2)
PROT SERPL-MCNC: 6.5 G/DL (ref 6–8.5)
QT INTERVAL: 330 MS
QTC INTERVAL: 474 MS
RBC # BLD AUTO: 5.37 10*6/MM3 (ref 4.14–5.8)
SARS-COV-2 RNA RESP QL NAA+PROBE: NOT DETECTED
SODIUM SERPL-SCNC: 137 MMOL/L (ref 136–145)
TROPONIN T SERPL HS-MCNC: 19 NG/L
WBC NRBC COR # BLD AUTO: 9.29 10*3/MM3 (ref 3.4–10.8)
WHOLE BLOOD HOLD COAG: NORMAL
WHOLE BLOOD HOLD SPECIMEN: NORMAL

## 2023-12-09 PROCEDURE — 93005 ELECTROCARDIOGRAM TRACING: CPT

## 2023-12-09 PROCEDURE — 99284 EMERGENCY DEPT VISIT MOD MDM: CPT

## 2023-12-09 PROCEDURE — 83036 HEMOGLOBIN GLYCOSYLATED A1C: CPT | Performed by: INTERNAL MEDICINE

## 2023-12-09 PROCEDURE — 83880 ASSAY OF NATRIURETIC PEPTIDE: CPT | Performed by: EMERGENCY MEDICINE

## 2023-12-09 PROCEDURE — 85025 COMPLETE CBC W/AUTO DIFF WBC: CPT | Performed by: EMERGENCY MEDICINE

## 2023-12-09 PROCEDURE — G0378 HOSPITAL OBSERVATION PER HR: HCPCS

## 2023-12-09 PROCEDURE — 71045 X-RAY EXAM CHEST 1 VIEW: CPT

## 2023-12-09 PROCEDURE — 36415 COLL VENOUS BLD VENIPUNCTURE: CPT

## 2023-12-09 PROCEDURE — 80053 COMPREHEN METABOLIC PANEL: CPT | Performed by: EMERGENCY MEDICINE

## 2023-12-09 PROCEDURE — 84484 ASSAY OF TROPONIN QUANT: CPT | Performed by: EMERGENCY MEDICINE

## 2023-12-09 PROCEDURE — 25010000002 FUROSEMIDE PER 20 MG: Performed by: EMERGENCY MEDICINE

## 2023-12-09 PROCEDURE — 93005 ELECTROCARDIOGRAM TRACING: CPT | Performed by: EMERGENCY MEDICINE

## 2023-12-09 PROCEDURE — 96375 TX/PRO/DX INJ NEW DRUG ADDON: CPT

## 2023-12-09 PROCEDURE — 87636 SARSCOV2 & INF A&B AMP PRB: CPT | Performed by: EMERGENCY MEDICINE

## 2023-12-09 PROCEDURE — 96374 THER/PROPH/DIAG INJ IV PUSH: CPT

## 2023-12-09 PROCEDURE — 63710000001 INSULIN REGULAR HUMAN PER 5 UNITS: Performed by: EMERGENCY MEDICINE

## 2023-12-09 RX ORDER — METOPROLOL TARTRATE 50 MG/1
50 TABLET, FILM COATED ORAL ONCE
Status: COMPLETED | OUTPATIENT
Start: 2023-12-09 | End: 2023-12-09

## 2023-12-09 RX ORDER — FUROSEMIDE 10 MG/ML
80 INJECTION INTRAMUSCULAR; INTRAVENOUS ONCE
Status: COMPLETED | OUTPATIENT
Start: 2023-12-09 | End: 2023-12-09

## 2023-12-09 RX ORDER — SODIUM CHLORIDE 0.9 % (FLUSH) 0.9 %
10 SYRINGE (ML) INJECTION AS NEEDED
Status: DISCONTINUED | OUTPATIENT
Start: 2023-12-09 | End: 2023-12-09 | Stop reason: HOSPADM

## 2023-12-09 RX ORDER — DILTIAZEM HYDROCHLORIDE 5 MG/ML
10 INJECTION INTRAVENOUS ONCE
Status: COMPLETED | OUTPATIENT
Start: 2023-12-09 | End: 2023-12-09

## 2023-12-09 RX ORDER — DILTIAZEM HCL/D5W 125 MG/125
5-15 PLASTIC BAG, INJECTION (ML) INTRAVENOUS CONTINUOUS
Status: DISCONTINUED | OUTPATIENT
Start: 2023-12-09 | End: 2023-12-09 | Stop reason: HOSPADM

## 2023-12-09 RX ORDER — FUROSEMIDE 20 MG/1
20 TABLET ORAL AS NEEDED
Qty: 30 TABLET | Refills: 0 | Status: SHIPPED | OUTPATIENT
Start: 2023-12-09

## 2023-12-09 RX ORDER — ENOXAPARIN SODIUM 100 MG/ML
1 INJECTION SUBCUTANEOUS ONCE
Qty: 1 ML | Refills: 0 | Status: DISCONTINUED | OUTPATIENT
Start: 2023-12-09 | End: 2023-12-09

## 2023-12-09 RX ADMIN — APIXABAN 5 MG: 5 TABLET, FILM COATED ORAL at 13:54

## 2023-12-09 RX ADMIN — METOPROLOL TARTRATE 50 MG: 50 TABLET ORAL at 13:55

## 2023-12-09 RX ADMIN — Medication 5 MG/HR: at 12:07

## 2023-12-09 RX ADMIN — DILTIAZEM HYDROCHLORIDE 10 MG: 5 INJECTION, SOLUTION INTRAVENOUS at 12:18

## 2023-12-09 RX ADMIN — FUROSEMIDE 80 MG: 10 INJECTION, SOLUTION INTRAMUSCULAR; INTRAVENOUS at 12:31

## 2023-12-09 RX ADMIN — INSULIN HUMAN 10 UNITS: 100 INJECTION, SOLUTION PARENTERAL at 12:36

## 2023-12-09 NOTE — ED PROVIDER NOTES
[place]    EMERGENCY DEPARTMENT ENCOUNTER      Pt Name: Alex Talbert  MRN: [id number]  YOB: 1955  Date of evaluation: 12/9/2023  Provider: Kojo Chris MD    CHIEF COMPLAINT       Chief Complaint   Patient presents with    Shortness of Breath         HISTORY OF PRESENT ILLNESS   Alex Talbert is a 68 y.o. male who presents to the emergency department ***       Nursing notes were reviewed.    REVIEW OF SYSTEMS     ROS:  A chief complaint appropriate review of systems was completed and is negative except as noted in the HPI.      PAST MEDICAL HISTORY     Past Medical History:   Diagnosis Date    Diabetes mellitus          SURGICAL HISTORY       Past Surgical History:   Procedure Laterality Date    EXPLORATORY LAPAROTOMY N/A 2/25/2018    Procedure: LAPAROTOMY EXPLORATORY;  Surgeon: Radha Siu MD;  Location: [facility];  Service:     TOE AMPUTATION Left          CURRENT MEDICATIONS       Current Facility-Administered Medications:     sodium chloride 0.9 % flush 10 mL, 10 mL, Intravenous, PRN, Kojo Chris MD    Current Outpatient Medications:     aspirin 81 MG EC tablet, Take 81 mg by mouth Daily., Disp: , Rfl:     Multiple Vitamins-Minerals (MULTIVITAMIN MEN 50+) tablet, Take 1 tablet by mouth Daily., Disp: , Rfl:     Trulicity 0.75 MG/0.5ML solution pen-injector, INJECT 0.75 MG UNDER THE SKIN INTO THE APPROPRIATE AREA AS DIRECTED ONE TIME PER WEEK, Disp: 6 mL, Rfl: 1    ALLERGIES     Codeine    FAMILY HISTORY       Family History   Problem Relation Age of Onset    Coronary artery disease Father           SOCIAL HISTORY       Social History     Socioeconomic History    Marital status:    Tobacco Use    Smoking status: Every Day     Types: Electronic Cigarette    Smokeless tobacco: Never   Substance and Sexual Activity    Alcohol use: No    Drug use: No    Sexual activity: Defer         PHYSICAL EXAM    (up to 7 for level 4, 8 or more for level 5)     Vitals:    12/09/23  "1126   BP: (!) 155/129   BP Location: Left arm   Patient Position: Sitting   Pulse: 70   Resp: 20   Temp: 97.8 °F (36.6 °C)   TempSrc: Oral   SpO2: 97%   Weight: 87.1 kg (192 lb)   Height: 182.9 cm (72\")       ***      DIAGNOSTIC RESULTS     EKG: All EKGs are interpreted by the Emergency Department Physician who either signs or Co-signs this chart in the absence of a cardiologist.    ECG 12 Lead ED Triage Standing Order; SOA   Preliminary Result   Test Reason : ED Triage Standing Order~   Blood Pressure :   */*   mmHG   Vent. Rate : 124 BPM     Atrial Rate : 416 BPM      P-R Int :   * ms          QRS Dur :  96 ms       QT Int : 330 ms       P-R-T Axes :   *  43 249 degrees      QTc Int : 474 ms      Atrial fibrillation with rapid ventricular response   Nonspecific T wave abnormality   Abnormal ECG   When compared with ECG of 03-MAR-2018 12:28,   Atrial fibrillation has replaced Sinus rhythm   Vent. rate has increased BY  48 BPM      Referred By: YEN           Confirmed By:             RADIOLOGY:   [x] Radiologist's Report Reviewed:  XR Chest 1 View    (Results Pending)       I ordered and independently reviewed the above noted radiographic studies.        LABS:    I have reviewed and interpreted all of the currently available lab results from this visit (if applicable):  Results for orders placed or performed during the hospital encounter of 12/09/23   ECG 12 Lead ED Triage Standing Order; SOA   Result Value Ref Range    QT Interval 330 ms    QTC Interval 474 ms        If labs were ordered, I independently reviewed the results and considered them in treating the patient.      EMERGENCY DEPARTMENT COURSE and DIFFERENTIAL DIAGNOSIS/MDM:   Vitals:  AS OF 11:40 EST    BP - (!) 155/129  HR - 70  TEMP - 97.8 °F (36.6 °C) (Oral)  O2 SATS - 97%        Discussion below represents my analysis of pertinent findings related to patient's condition, differential diagnosis, treatment plan and final disposition.      Differential " diagnosis:  The differential diagnosis associated with the patient's presentation includes: ***      Independent interpretations (ECG/rhythm strip/X-ray/US/CT scan): ***      Additional sources:  Discussed/obtained information from independent historians:   [] Spouse:   [] Parent:   [] Friend:   [] EMS:   [] Other:  External (non-ED) record review:   [] Inpatient record:   [] Office record:   [] Outpatient record:   [] Prior Outpatient labs:   [] Prior Outpatient radiology:   [] Primary Care record:   [] Outside ED record:   [] Other:       Patient's care impacted by:   [] Diabetes   [] Hypertension   [] Coronary Artery Disease   [] Cancer   [] Other:     Care significantly affected by Social Determinants of Health (housing and economic circumstances, unemployment)    [] Yes     [] No   If yes, Patient's care significantly limited by  Social Determinants of Health including:    [] Inadequate housing    [] Low income    [] Alcoholism and drug addiction in family    [] Problems related to primary support group    [] Unemployment    [] Problems related to employment    [] Other Social Determinants of Health:       Consideration of admission/observation vs discharge: ***      I considered prescription management with: ***   [] Pain medication:   [] Antiviral:   [] Antibiotic:   [] Other:    Additional orders considered but not ordered:  The following testing was considered but ultimately not selected after discussion with patient/family: ***    ED Course:           ***    I had a discussion with the patient/family regarding diagnosis, diagnostic results, treatment plan, and medications.  The patient/family indicated understanding of these instructions.  I spent adequate time at the bedside preceding discharge necessary to personally discuss the aftercare instructions, giving patient education, providing explanations of the results of our evaluations/findings, and my decision making to assure that the patient/family  understand the plan of care.  Time was allotted to answer questions at that time and throughout the ED course.  Emphasis was placed on timely follow-up after discharge.  I also discussed the potential for the development of an acute emergent condition requiring further evaluation, admission, or even surgical intervention. I discussed that we found nothing during the visit today indicating the need for further workup, admission, or the presence of an unstable medical condition.  I encouraged the patient to return to the emergency department immediately for ANY concerns, worsening, new complaints, or if symptoms persist and unable to seek follow-up in a timely fashion.  The patient/family expressed understanding and agreement with this plan.  The patient will follow-up with their PCP in 1-2 days for reevaluation.           PROCEDURES:  Procedures    CRITICAL CARE TIME        FINAL IMPRESSION    No diagnosis found.      DISPOSITION/PLAN     ED Disposition       None              Comment: Please note this report has been produced using speech recognition software.      Kojo Chris MD  Attending Emergency Physician           DIFFERENTIAL DIAGNOSIS/MDM:   Vitals:  AS OF 14:05 EST    BP - 100/82  HR - 90  TEMP - 97.8 °F (36.6 °C) (Oral)  O2 SATS - 92%        Discussion below represents my analysis of pertinent findings related to patient's condition, differential diagnosis, treatment plan and final disposition.      Differential diagnosis:  The differential diagnosis associated with the patient's presentation includes: Atrial fibrillation, CHF, pneumonia, pneumothorax, ACS, anemia      Independent interpretations (ECG/rhythm strip/X-ray/US/CT scan): I independently interpreted the patient's chest x-ray and cardiac monitor.  Patient with pulmonary edema and A-fib with RVR.      Patient's care impacted by:   [x] Diabetes   [] Hypertension   [] Coronary Artery Disease   [] Cancer   [] Other:     Care significantly affected by Social Determinants of Health (housing and economic circumstances, unemployment)    [] Yes     [x] No   If yes, Patient's care significantly limited by  Social Determinants of Health including:    [] Inadequate housing    [] Low income    [] Alcoholism and drug addiction in family    [] Problems related to primary support group    [] Unemployment    [] Problems related to employment    [] Other Social Determinants of Health:       Consideration of admission/observation vs discharge: I originally plan to admit the patient, however he was evaluated by cardiology and they felt that he can go home.      I considered prescription management with:    [] Pain medication:   [] Antiviral:   [] Antibiotic:   [x] Other: Patient was started on Eliquis and metoprolol    ED Course:    ED Course as of 12/14/23 1405   Sat Dec 09, 2023   1229 I spoke with hospitalist Dr. Shaffer.  Discussed history, presentation, workup.  Accepts patient for admission. [NS]   1238 I spoke w/ Dr. Huang with cardiology. Agrees with management so far. Will consult. [NS]   1254 Cardiology Dr. Huang has personally evaluated the patient.  He feels that  patient can go home and he will follow-up with him on Tuesday.  Recommend starting the patient on metoprolol 50 mg twice daily, Lasix 20 mg as needed, and Eliquis.  Does not feel the patient needs to be admitted.  Also spoke with hospitalist Dr. Rico who has also evaluated the patient.  She also feels that patient looks well and can go home to follow-up with cardiology. [NS]      ED Course User Index  [NS] Kojo Chris MD           PROCEDURES:  Procedures    CRITICAL CARE TIME    Approximately 35 minutes of discontinuous critical care time was provided to this patient by myself absent of any time spent performing procedures.  Patient presents critically ill with with atrial fibrillation with rapid ventricular response along with pulmonary edema placing the cardiovascular, respiratory, neurologic, renal systems at risk requiring the following interventions: IV diltiazem, IV Lasix, interpretation of lab/ECG/imaging, frequent reassessment, specialist consultation with the following response: Improvement in heart rate.  Patient at high risk of deterioration and possibly death without these interventions.      FINAL IMPRESSION      1. Atrial fibrillation with rapid ventricular response    2. Acute pulmonary edema    3. Shortness of breath    4. History of diabetes mellitus          DISPOSITION/PLAN     ED Disposition       ED Disposition   Discharge    Condition   Stable    Comment   Level of Care: Telemetry [5]  Diagnosis: Atrial fibrillation with RVR [006902]  Admitting Physician: SARA RICO [[id number]]                   Comment: Please note this report has been produced using speech recognition software.      Kojo Chris MD  Attending Emergency Physician             Kojo Chris MD  12/14/23 [id number]       Kojo Chris MD  12/14/23 [id number]

## 2023-12-09 NOTE — Clinical Note
Level of Care: Telemetry [5]   Diagnosis: Atrial fibrillation with RVR [002108]   Admitting Physician: SARA KAY [[id number]]

## 2023-12-30 LAB
QT INTERVAL: 330 MS
QTC INTERVAL: 474 MS

## 2024-01-22 ENCOUNTER — HOSPITAL ENCOUNTER (OUTPATIENT)
Dept: CARDIOLOGY | Facility: HOSPITAL | Age: 69
Discharge: HOME | End: 2024-01-22
Attending: INTERNAL MEDICINE | Admitting: INTERNAL MEDICINE
Payer: MEDICARE

## 2024-01-22 VITALS
WEIGHT: 189.8 LBS | BODY MASS INDEX: 25.71 KG/M2 | HEIGHT: 72 IN | HEART RATE: 81 BPM | DIASTOLIC BLOOD PRESSURE: 118 MMHG | RESPIRATION RATE: 14 BRPM | SYSTOLIC BLOOD PRESSURE: 141 MMHG

## 2024-01-22 DIAGNOSIS — I48.11 LONGSTANDING PERSISTENT ATRIAL FIBRILLATION: ICD-10-CM

## 2024-01-22 PROCEDURE — 92960 CARDIOVERSION ELECTRIC EXT: CPT

## 2024-01-22 PROCEDURE — G0463 HOSPITAL OUTPT CLINIC VISIT: HCPCS

## 2024-01-22 RX ORDER — SACUBITRIL AND VALSARTAN 24; 26 MG/1; MG/1
1 TABLET, FILM COATED ORAL 2 TIMES DAILY
COMMUNITY

## 2024-01-22 RX ORDER — AMIODARONE HYDROCHLORIDE 200 MG/1
200 TABLET ORAL 2 TIMES DAILY
COMMUNITY

## 2024-01-22 NOTE — PROGRESS NOTES
Alex Talbert  1955    Aelx Talbert Arrived today for elective ECV at [facility].  Unfortunately, he informed us that he had been out of his Eliquis for approximately 1 week.  He received prescription from the ED (starter pack).  Also, he was instructed to take it once a day instead of twice a day.    At this time, procedure will be canceled.  I have called in prescription for Eliquis 5 mg 1 p.o. twice daily, patient to  at [facility] outpatient pharmacy today.  Patient and wife were instructed on medication, dosing.  Will have our office call patient to reschedule outpatient ECV in 3 weeks (after February 12, 2024).  No other changes made to his medical regimen.    Cielo Woodruff PA-C  Electronically signed by BRIDGETTE Chamorro, 01/22/24, 8:14 AM EST.

## 2024-01-22 NOTE — NURSING NOTE
Patient has not been taking eliquis for the past week due to not having a refill.  Prescription sent in to [facility].  Procedure cancelled at this time.

## 2024-02-13 ENCOUNTER — HOSPITAL ENCOUNTER (OUTPATIENT)
Dept: CARDIOLOGY | Facility: HOSPITAL | Age: 69
Discharge: HOME | End: 2024-02-13
Attending: INTERNAL MEDICINE | Admitting: INTERNAL MEDICINE
Payer: MEDICARE

## 2024-02-13 VITALS — DIASTOLIC BLOOD PRESSURE: 76 MMHG | SYSTOLIC BLOOD PRESSURE: 134 MMHG | HEART RATE: 46 BPM | OXYGEN SATURATION: 99 %

## 2024-02-13 DIAGNOSIS — I48.11 LONGSTANDING PERSISTENT ATRIAL FIBRILLATION: ICD-10-CM

## 2024-02-13 LAB
ANION GAP SERPL CALCULATED.3IONS-SCNC: 14 MMOL/L (ref 5–15)
BUN SERPL-MCNC: 26 MG/DL (ref 8–23)
BUN/CREAT SERPL: 28.9 (ref 7–25)
CALCIUM SPEC-SCNC: 9.4 MG/DL (ref 8.6–10.5)
CHLORIDE SERPL-SCNC: 106 MMOL/L (ref 98–107)
CO2 SERPL-SCNC: 22 MMOL/L (ref 22–29)
CREAT SERPL-MCNC: 0.9 MG/DL (ref 0.76–1.27)
DEPRECATED RDW RBC AUTO: 42.8 FL (ref 37–54)
EGFRCR SERPLBLD CKD-EPI 2021: 94.2 ML/MIN/1.73
ERYTHROCYTE [DISTWIDTH] IN BLOOD BY AUTOMATED COUNT: 13.2 % (ref 12.3–15.4)
GLUCOSE SERPL-MCNC: 169 MG/DL (ref 65–99)
HCT VFR BLD AUTO: 53.9 % (ref 37.5–51)
HGB BLD-MCNC: 17 G/DL (ref 13–17.7)
MCH RBC QN AUTO: 27.9 PG (ref 26.6–33)
MCHC RBC AUTO-ENTMCNC: 31.5 G/DL (ref 31.5–35.7)
MCV RBC AUTO: 88.5 FL (ref 79–97)
PLATELET # BLD AUTO: 212 10*3/MM3 (ref 140–450)
PMV BLD AUTO: 10.8 FL (ref 6–12)
POTASSIUM SERPL-SCNC: 4.1 MMOL/L (ref 3.5–5.2)
RBC # BLD AUTO: 6.09 10*6/MM3 (ref 4.14–5.8)
SODIUM SERPL-SCNC: 142 MMOL/L (ref 136–145)
WBC NRBC COR # BLD AUTO: 8 10*3/MM3 (ref 3.4–10.8)

## 2024-02-13 PROCEDURE — 92960 CARDIOVERSION ELECTRIC EXT: CPT

## 2024-02-13 PROCEDURE — 25010000002 MIDAZOLAM PER 1 MG: Performed by: INTERNAL MEDICINE

## 2024-02-13 PROCEDURE — 36415 COLL VENOUS BLD VENIPUNCTURE: CPT

## 2024-02-13 PROCEDURE — 80048 BASIC METABOLIC PNL TOTAL CA: CPT | Performed by: INTERNAL MEDICINE

## 2024-02-13 PROCEDURE — 85027 COMPLETE CBC AUTOMATED: CPT | Performed by: INTERNAL MEDICINE

## 2024-02-13 PROCEDURE — 93005 ELECTROCARDIOGRAM TRACING: CPT | Performed by: INTERNAL MEDICINE

## 2024-02-13 RX ORDER — FLUMAZENIL 0.1 MG/ML
0.5 INJECTION INTRAVENOUS ONCE AS NEEDED
Status: DISCONTINUED | OUTPATIENT
Start: 2024-02-13 | End: 2024-02-13 | Stop reason: HOSPADM

## 2024-02-13 RX ORDER — MIDAZOLAM HYDROCHLORIDE 1 MG/ML
2-8 INJECTION INTRAMUSCULAR; INTRAVENOUS ONCE AS NEEDED
Status: DISCONTINUED | OUTPATIENT
Start: 2024-02-13 | End: 2024-02-13 | Stop reason: HOSPADM

## 2024-02-13 RX ORDER — FENTANYL CITRATE 50 UG/ML
50-100 INJECTION, SOLUTION INTRAMUSCULAR; INTRAVENOUS ONCE AS NEEDED
Status: DISCONTINUED | OUTPATIENT
Start: 2024-02-13 | End: 2024-02-13 | Stop reason: HOSPADM

## 2024-02-13 RX ORDER — NALOXONE HCL 0.4 MG/ML
0.4 VIAL (ML) INJECTION ONCE AS NEEDED
Status: DISCONTINUED | OUTPATIENT
Start: 2024-02-13 | End: 2024-02-13 | Stop reason: HOSPADM

## 2024-02-13 RX ORDER — MIDAZOLAM HYDROCHLORIDE 1 MG/ML
INJECTION INTRAMUSCULAR; INTRAVENOUS
Status: COMPLETED | OUTPATIENT
Start: 2024-02-13 | End: 2024-02-13

## 2024-02-13 RX ADMIN — MIDAZOLAM HYDROCHLORIDE 2 MG: 1 INJECTION, SOLUTION INTRAMUSCULAR; INTRAVENOUS at 09:07

## 2024-02-13 RX ADMIN — METHOHEXITAL SODIUM 20 MG: 500 INJECTION, POWDER, LYOPHILIZED, FOR SOLUTION INTRAMUSCULAR; INTRAVENOUS; RECTAL at 09:07

## 2024-02-16 LAB
QT INTERVAL: 488 MS
QTC INTERVAL: 417 MS

## (undated) DEVICE — SAFESECURE,SECUREMENT,FOLEY CATH,STERILE: Brand: MEDLINE

## (undated) DEVICE — SUT PDS 1 TP1 48IN Z880G BX/12

## (undated) DEVICE — GLV SURG TRIUMPH ORTHO W/ALOE PF LTX 7.5 STRL

## (undated) DEVICE — TOTAL TRAY, 16FR 10ML SIL FOLEY, URN: Brand: MEDLINE

## (undated) DEVICE — CLTH CLENS READYCLEANSE PERI CARE PK/5

## (undated) DEVICE — LEX GENERAL ABDOMINAL SPLIT: Brand: MEDLINE INDUSTRIES, INC.

## (undated) DEVICE — SPNG GZ WOVN 4X4IN 12PLY 10/BX STRL

## (undated) DEVICE — SUT SILK 2/0 TIES 18IN A185H

## (undated) DEVICE — SUT SILK 3/0 TIES 18IN A184H